# Patient Record
Sex: FEMALE | Race: WHITE | URBAN - METROPOLITAN AREA
[De-identification: names, ages, dates, MRNs, and addresses within clinical notes are randomized per-mention and may not be internally consistent; named-entity substitution may affect disease eponyms.]

---

## 2018-09-30 ENCOUNTER — INPATIENT (INPATIENT)
Facility: HOSPITAL | Age: 83
LOS: 3 days | Discharge: HOME HEALTH | End: 2018-10-04
Attending: FAMILY MEDICINE | Admitting: FAMILY MEDICINE
Payer: MEDICARE

## 2018-09-30 VITALS
TEMPERATURE: 99 F | DIASTOLIC BLOOD PRESSURE: 107 MMHG | HEIGHT: 63 IN | WEIGHT: 160.06 LBS | SYSTOLIC BLOOD PRESSURE: 184 MMHG | RESPIRATION RATE: 18 BRPM | HEART RATE: 95 BPM | OXYGEN SATURATION: 98 %

## 2018-09-30 LAB
ALBUMIN SERPL ELPH-MCNC: 3.6 G/DL — SIGNIFICANT CHANGE UP (ref 3.3–5)
ALP SERPL-CCNC: 485 U/L — HIGH (ref 40–120)
ALT FLD-CCNC: 27 U/L — SIGNIFICANT CHANGE UP (ref 12–78)
ANION GAP SERPL CALC-SCNC: 8 MMOL/L — SIGNIFICANT CHANGE UP (ref 5–17)
APPEARANCE UR: CLEAR — SIGNIFICANT CHANGE UP
APTT BLD: 31.3 SEC — SIGNIFICANT CHANGE UP (ref 27.5–37.4)
AST SERPL-CCNC: 30 U/L — SIGNIFICANT CHANGE UP (ref 15–37)
BACTERIA # UR AUTO: ABNORMAL
BASOPHILS # BLD AUTO: 0.06 K/UL — SIGNIFICANT CHANGE UP (ref 0–0.2)
BASOPHILS NFR BLD AUTO: 0.6 % — SIGNIFICANT CHANGE UP (ref 0–2)
BILIRUB SERPL-MCNC: 0.7 MG/DL — SIGNIFICANT CHANGE UP (ref 0.2–1.2)
BILIRUB UR-MCNC: NEGATIVE — SIGNIFICANT CHANGE UP
BUN SERPL-MCNC: 15 MG/DL — SIGNIFICANT CHANGE UP (ref 7–23)
CALCIUM SERPL-MCNC: 9.1 MG/DL — SIGNIFICANT CHANGE UP (ref 8.5–10.1)
CHLORIDE SERPL-SCNC: 105 MMOL/L — SIGNIFICANT CHANGE UP (ref 96–108)
CO2 SERPL-SCNC: 26 MMOL/L — SIGNIFICANT CHANGE UP (ref 22–31)
COLOR SPEC: YELLOW — SIGNIFICANT CHANGE UP
CREAT SERPL-MCNC: 0.74 MG/DL — SIGNIFICANT CHANGE UP (ref 0.5–1.3)
DIFF PNL FLD: ABNORMAL
EOSINOPHIL # BLD AUTO: 0 K/UL — SIGNIFICANT CHANGE UP (ref 0–0.5)
EOSINOPHIL NFR BLD AUTO: 0 % — SIGNIFICANT CHANGE UP (ref 0–6)
EPI CELLS # UR: SIGNIFICANT CHANGE UP
GLUCOSE SERPL-MCNC: 98 MG/DL — SIGNIFICANT CHANGE UP (ref 70–99)
GLUCOSE UR QL: NEGATIVE MG/DL — SIGNIFICANT CHANGE UP
HCT VFR BLD CALC: 44.5 % — SIGNIFICANT CHANGE UP (ref 34.5–45)
HGB BLD-MCNC: 15.3 G/DL — SIGNIFICANT CHANGE UP (ref 11.5–15.5)
IMM GRANULOCYTES NFR BLD AUTO: 0.2 % — SIGNIFICANT CHANGE UP (ref 0–1.5)
INR BLD: 1.24 RATIO — HIGH (ref 0.88–1.16)
KETONES UR-MCNC: NEGATIVE — SIGNIFICANT CHANGE UP
LEUKOCYTE ESTERASE UR-ACNC: ABNORMAL
LYMPHOCYTES # BLD AUTO: 2.52 K/UL — SIGNIFICANT CHANGE UP (ref 1–3.3)
LYMPHOCYTES # BLD AUTO: 25.4 % — SIGNIFICANT CHANGE UP (ref 13–44)
MCHC RBC-ENTMCNC: 31.4 PG — SIGNIFICANT CHANGE UP (ref 27–34)
MCHC RBC-ENTMCNC: 34.4 GM/DL — SIGNIFICANT CHANGE UP (ref 32–36)
MCV RBC AUTO: 91.4 FL — SIGNIFICANT CHANGE UP (ref 80–100)
MONOCYTES # BLD AUTO: 0.78 K/UL — SIGNIFICANT CHANGE UP (ref 0–0.9)
MONOCYTES NFR BLD AUTO: 7.8 % — SIGNIFICANT CHANGE UP (ref 2–14)
NEUTROPHILS # BLD AUTO: 6.56 K/UL — SIGNIFICANT CHANGE UP (ref 1.8–7.4)
NEUTROPHILS NFR BLD AUTO: 66 % — SIGNIFICANT CHANGE UP (ref 43–77)
NITRITE UR-MCNC: NEGATIVE — SIGNIFICANT CHANGE UP
NRBC # BLD: 0 /100 WBCS — SIGNIFICANT CHANGE UP (ref 0–0)
PH UR: 6.5 — SIGNIFICANT CHANGE UP (ref 5–8)
PLATELET # BLD AUTO: 277 K/UL — SIGNIFICANT CHANGE UP (ref 150–400)
POTASSIUM SERPL-MCNC: 4 MMOL/L — SIGNIFICANT CHANGE UP (ref 3.5–5.3)
POTASSIUM SERPL-SCNC: 4 MMOL/L — SIGNIFICANT CHANGE UP (ref 3.5–5.3)
PROT SERPL-MCNC: 8.1 GM/DL — SIGNIFICANT CHANGE UP (ref 6–8.3)
PROT UR-MCNC: NEGATIVE MG/DL — SIGNIFICANT CHANGE UP
PROTHROM AB SERPL-ACNC: 13.4 SEC — HIGH (ref 9.8–12.7)
RBC # BLD: 4.87 M/UL — SIGNIFICANT CHANGE UP (ref 3.8–5.2)
RBC # FLD: 12.6 % — SIGNIFICANT CHANGE UP (ref 10.3–14.5)
RBC CASTS # UR COMP ASSIST: SIGNIFICANT CHANGE UP /HPF (ref 0–4)
SODIUM SERPL-SCNC: 139 MMOL/L — SIGNIFICANT CHANGE UP (ref 135–145)
SP GR SPEC: 1.01 — SIGNIFICANT CHANGE UP (ref 1.01–1.02)
TROPONIN I SERPL-MCNC: <0.015 NG/ML — SIGNIFICANT CHANGE UP (ref 0.01–0.04)
TROPONIN I SERPL-MCNC: <0.015 NG/ML — SIGNIFICANT CHANGE UP (ref 0.01–0.04)
UROBILINOGEN FLD QL: NEGATIVE MG/DL — SIGNIFICANT CHANGE UP
WBC # BLD: 9.94 K/UL — SIGNIFICANT CHANGE UP (ref 3.8–10.5)
WBC # FLD AUTO: 9.94 K/UL — SIGNIFICANT CHANGE UP (ref 3.8–10.5)
WBC UR QL: ABNORMAL

## 2018-09-30 PROCEDURE — 93010 ELECTROCARDIOGRAM REPORT: CPT

## 2018-09-30 PROCEDURE — 99285 EMERGENCY DEPT VISIT HI MDM: CPT

## 2018-09-30 PROCEDURE — 71045 X-RAY EXAM CHEST 1 VIEW: CPT | Mod: 26

## 2018-09-30 PROCEDURE — 70450 CT HEAD/BRAIN W/O DYE: CPT | Mod: 26

## 2018-09-30 RX ORDER — CEFTRIAXONE 500 MG/1
1000 INJECTION, POWDER, FOR SOLUTION INTRAMUSCULAR; INTRAVENOUS ONCE
Qty: 0 | Refills: 0 | Status: COMPLETED | OUTPATIENT
Start: 2018-09-30 | End: 2018-09-30

## 2018-09-30 RX ORDER — CEFTRIAXONE 500 MG/1
1000 INJECTION, POWDER, FOR SOLUTION INTRAMUSCULAR; INTRAVENOUS EVERY 24 HOURS
Qty: 0 | Refills: 0 | Status: DISCONTINUED | OUTPATIENT
Start: 2018-09-30 | End: 2018-10-03

## 2018-09-30 RX ORDER — SODIUM CHLORIDE 9 MG/ML
1000 INJECTION INTRAMUSCULAR; INTRAVENOUS; SUBCUTANEOUS ONCE
Qty: 0 | Refills: 0 | Status: COMPLETED | OUTPATIENT
Start: 2018-09-30 | End: 2018-09-30

## 2018-09-30 RX ORDER — SODIUM CHLORIDE 9 MG/ML
500 INJECTION INTRAMUSCULAR; INTRAVENOUS; SUBCUTANEOUS
Qty: 0 | Refills: 0 | Status: DISCONTINUED | OUTPATIENT
Start: 2018-09-30 | End: 2018-10-04

## 2018-09-30 RX ADMIN — CEFTRIAXONE 1000 MILLIGRAM(S): 500 INJECTION, POWDER, FOR SOLUTION INTRAMUSCULAR; INTRAVENOUS at 19:23

## 2018-09-30 RX ADMIN — SODIUM CHLORIDE 2000 MILLILITER(S): 9 INJECTION INTRAMUSCULAR; INTRAVENOUS; SUBCUTANEOUS at 13:50

## 2018-09-30 NOTE — H&P ADULT - ASSESSMENT
86 yo F with PMH significant for Afib On Eliquis BIB daughter to the ED for eval. of Dizziness.    # UTI   - Admit to Med-Surg  - Pt received IV Ceftriaxone in the ED  - c/w IV Ceftriaxone  - UC/BC  - Tylenol prn    # Dizziness: likely 2/2 to dehydration  - Pt received IVF Ns 1L in  the ED, no dizziness at present  - c/w IV hydration  - Fall precaution  - Neuro check  - Orthostatic vital    # Hx of Afib on Eliquis  - c/w Eliquis  - Meds..    # DVT Ppx  - On eliquis    IMPROVE VTE Individual Risk Assessment    RISK                                                                Points    [  ] Previous VTE                                                  3    [  ] Thrombophilia                                               2    [  ] Lower limb paralysis                                      2        (unable to hold up >15 seconds)      [  ] Current Cancer                                              2         (within 6 months)    [  ] Immobilization > 24 hrs                                1    [  ] ICU/CCU stay > 24 hours                              1    [  ] Age > 60                                                      1    IMPROVE VTE Score ____2_____    Case d/w  86 yo F with PMH significant for Afib On Eliquis BIB daughter to the ED for eval. of Dizziness.    # UTI   - Admit to Med-Surg  - Pt received IV Ceftriaxone in the ED  - c/w IV Ceftriaxone  - UC/BC  - Tylenol prn    # Dizziness: likely 2/2 to dehydration  - Pt received IVF Ns 1L in  the ED, no dizziness at present  - c/w IV hydration  - Fall precaution  - Neuro check  - Orthostatic vital    # Elevated ALP  - ALP: 485, In the past ALP was elevated too  - CT head resulted as The calvarium demonstrates patchy sclerosis which may reflect Pagets disease.  - Daytime to obtain further collateral form PCP in AM and based on w/u in the past, order further w/u and consult if needed    # Hx of Afib on Eliquis  - c/w Eliquis  - Meds..    # DVT Ppx  - On eliquis    IMPROVE VTE Individual Risk Assessment    RISK                                                                Points    [  ] Previous VTE                                                  3    [  ] Thrombophilia                                               2    [  ] Lower limb paralysis                                      2        (unable to hold up >15 seconds)      [  ] Current Cancer                                              2         (within 6 months)    [  ] Immobilization > 24 hrs                                1    [  ] ICU/CCU stay > 24 hours                              1    [  ] Age > 60                                                      1    IMPROVE VTE Score ____2_____    Case d/w  86 yo F with PMH significant for Afib On Eliquis, Dyslipidemia, CAD, Paget's disease BIB daughter to the ED for eval. of Dizziness.    # UTI   - Admit to Med-Surg  - Pt received IV Ceftriaxone in the ED  - c/w IV Ceftriaxone  - UC/BC  - Tylenol prn    # Dizziness: likely 2/2 to dehydration, r/o Arrythmia  - Pt received IVF Ns 1L in  the ED, no dizziness at present  - c/w IV hydration  - Fall precaution  - Neuro check  - Neuro consult  - Vitamin B12,TSH  - Orthostatic vital  - Trop x 2 negative    # Hx of Paget's disease - Elevated ALP  - ALP: 485, In the past ALP was elevated too  - CT head resulted as The calvarium demonstrates patchy sclerosis which may reflect Pagets disease.  - Outpt f/u after discharge     # Hx of Afib on Eliquis  - c/w Eliquis  - Pt could not provide meds list - daytime to verify home meds in AM    # DVT Ppx  - On Eliquis    ****** Pt is poor historian, confused, does not remember home meds, Daytime to verify home meds in AM and to complete med-Rec    IMPROVE VTE Individual Risk Assessment    RISK                                                                Points    [  ] Previous VTE                                                  3    [  ] Thrombophilia                                               2    [  ] Lower limb paralysis                                      2        (unable to hold up >15 seconds)      [  ] Current Cancer                                              2         (within 6 months)    [  ] Immobilization > 24 hrs                                1    [  ] ICU/CCU stay > 24 hours                              1    [  ] Age > 60                                                      1    IMPROVE VTE Score ____2_____    Case d/w Dr. Negro 88 yo F with PMH significant for Afib On Eliquis, Dyslipidemia, CAD, Paget's disease BIB daughter to the ED for eval. of Dizziness.    # UTI   - Admit to Med-Surg  - Pt received IV Ceftriaxone in the ED  - c/w IV Ceftriaxone  - UC/BC  - Tylenol prn    # Dizziness  # Dehydration  - Pt received IVF Ns 1L in  the ED, no dizziness at present  - c/w IV hydration  - Fall precaution  - Neuro check  - Neuro consult  - Vitamin B12,TSH  - Orthostatic vital  - Trop x 2 negative  - CTHead: negative for acute ICH or infarct    # Hx of Paget's disease - Elevated ALP  - ALP: 485, In the past ALP was elevated too  - CT head resulted as The calvarium demonstrates patchy sclerosis which may reflect Pagets disease.  - Outpt f/u after discharge     # Hx of Afib on Eliquis  - c/w Eliquis  - Pt could not provide meds list - daytime to verify home meds in AM    # DVT Ppx  - On Eliquis    ****** Pt is poor historian, confused, does not remember home meds, Daytime to verify home meds in AM and to complete med-Rec    IMPROVE VTE Individual Risk Assessment    RISK                                                                Points    [  ] Previous VTE                                                  3    [  ] Thrombophilia                                               2    [  ] Lower limb paralysis                                      2        (unable to hold up >15 seconds)      [  ] Current Cancer                                              2         (within 6 months)    [  ] Immobilization > 24 hrs                                1    [  ] ICU/CCU stay > 24 hours                              1    [  ] Age > 60                                                      1    IMPROVE VTE Score ____2_____    Case d/w Dr. Negro

## 2018-09-30 NOTE — ED PROVIDER NOTE - CARDIAC, MLM
Normal rate, irregularly irregular rhythm.  Heart sounds S1, S2.  No murmurs, rubs or gallops. No carotid bruit

## 2018-09-30 NOTE — ED PROVIDER NOTE - OBJECTIVE STATEMENT
86 y/o female with a PMHx of AFib on Eliquis presents to the ED c/o dizziness. Daughter notes pt was at baseline last night and woke up this AM with onset of dizziness. Pt was unable to ambulate, normally walks without cane or walker at baseline. Former smoker, occasional ETOH use. Pt does not feel dizzy in the ED. Denies painful urination, NVD, or abd pain. PCP Dr. Pena.

## 2018-09-30 NOTE — ED ADULT TRIAGE NOTE - CHIEF COMPLAINT QUOTE
Pt BIBA with report of hypertension. Pt reports feeling fatigued and generally weak, but denies any focal weakness.  Pt reports feeling dizzy and unable to walk without assistance this am. Pt normally active and gardening.  As per EMS, no focal weakness. Pt BIBA with report of hypertension. Pt reports feeling fatigued and generally weak, but denies any focal weakness.  Pt reports feeling dizzy and unable to walk without assistance this am. Pt normally active and gardening.  As per EMS, no focal weakness.  No facial droop or speech changes. Pt alert and able to answer questions appropriately.

## 2018-09-30 NOTE — ED ADULT NURSE NOTE - CHPI ED NUR SYMPTOMS NEG
no vomiting/no confusion/no change in level of consciousness/no blurred vision/no fever/no loss of consciousness/no nausea/no numbness/no weakness

## 2018-09-30 NOTE — H&P ADULT - ATTENDING COMMENTS
Patient seen and examined after initial evaluation above by BERTA Jones. Case discussed and reviewed in detail. Please note my plan below.     86 y/o F with PMH of a-fib, dyslipidemia, paget's disease, CAD, p/w dizziness.     *Dizziness   -CTH negative for acute findings  -Neuro consult  -Fall precautions  -Orthostatics  -Tele monitoring  -Vit B12 / TSH    *UTI  -Ceftriaxone  -Urine cx     *H/o Dyslipidemia / paget's disease / CAD  -Will need to confirm home medication list in AM as patient is a poor historian    *DVT ppx  -On Eliquis

## 2018-09-30 NOTE — ED ADULT NURSE NOTE - NSIMPLEMENTINTERV_GEN_ALL_ED
Implemented All Universal Safety Interventions:  [place] to call system. Call bell, personal items and telephone within reach. Instruct patient to call for assistance. Room bathroom lighting operational. Non-slip footwear when patient is off stretcher. Physically safe environment: no spills, clutter or unnecessary equipment. Stretcher in lowest position, wheels locked, appropriate side rails in place.

## 2018-09-30 NOTE — ED PROVIDER NOTE - EYES, MLM
Clear bilaterally, pupils equal, round and reactive to light. No horizontal nystagmus. Negative Paul Halpike test.

## 2018-09-30 NOTE — H&P ADULT - NSHPPHYSICALEXAM_GEN_ALL_CORE
Vital Signs Last 24 Hrs  T(C): 36.7 (30 Sep 2018 20:53), Max: 37.1 (30 Sep 2018 13:17)  T(F): 98.1 (30 Sep 2018 20:53), Max: 98.7 (30 Sep 2018 13:17)  HR: 83 (30 Sep 2018 20:53) (80 - 95)  BP: 131/78 (30 Sep 2018 20:53) (131/78 - 184/107)  RR: 19 (30 Sep 2018 20:53) (18 - 19)  SpO2: 97% (30 Sep 2018 20:53) (97% - 98%)

## 2018-09-30 NOTE — ED ADULT NURSE NOTE - CHIEF COMPLAINT QUOTE
Pt BIBA with report of hypertension. Pt reports feeling fatigued and generally weak, but denies any focal weakness.  Pt reports feeling dizzy and unable to walk without assistance this am. Pt normally active and gardening.  As per EMS, no focal weakness.  No facial droop or speech changes. Pt alert and able to answer questions appropriately.

## 2018-09-30 NOTE — H&P ADULT - HISTORY OF PRESENT ILLNESS
86 yo F with PMH significant for Afib On Eliquis BIB daughter to the ED for eval. of Dizziness. Pt is poor historian, confused. Daughter was at bedside earlier but not on my assessment. As per ED report, Pt was c/o dizziness this AM. No dizziness at present. In the ED, on w/u found to have UTI. Denies headache, chest pain, palpitations or SOB. Denies fever, chills, cough or dysuria, urinary urgency or frequency.     Pt received IVF Nacl 1L and IV Ceftriaxone x 1 in the ED.     Family h/o: Pt could not provide any detail of family h/o. 88 yo F with PMH significant for Afib On Eliquis, Dyslipidemia, CAD, Paget's Disease BIB daughter to the ED for eval. of Dizziness. Pt is poor historian, confused. Daughter was at bedside earlier but not during my assessment. As per ED report, Pt was c/o dizziness this AM. No dizziness at present. In the ED, on w/u found to have UTI. Denies headache, chest pain, palpitations or SOB. Denies fever, chills, cough or dysuria, urinary urgency or frequency.     Pt received IVF Nacl 1L and IV Ceftriaxone x 1 in the ED.     Family h/o: Pt could not provide any detail of family h/o.

## 2018-10-01 LAB
ANION GAP SERPL CALC-SCNC: 9 MMOL/L — SIGNIFICANT CHANGE UP (ref 5–17)
APTT BLD: 29.4 SEC — SIGNIFICANT CHANGE UP (ref 27.5–37.4)
BASOPHILS # BLD AUTO: 0.05 K/UL — SIGNIFICANT CHANGE UP (ref 0–0.2)
BASOPHILS NFR BLD AUTO: 0.6 % — SIGNIFICANT CHANGE UP (ref 0–2)
BUN SERPL-MCNC: 14 MG/DL — SIGNIFICANT CHANGE UP (ref 7–23)
CALCIUM SERPL-MCNC: 8.5 MG/DL — SIGNIFICANT CHANGE UP (ref 8.5–10.1)
CHLORIDE SERPL-SCNC: 106 MMOL/L — SIGNIFICANT CHANGE UP (ref 96–108)
CHOLEST SERPL-MCNC: 162 MG/DL — SIGNIFICANT CHANGE UP (ref 10–199)
CO2 SERPL-SCNC: 23 MMOL/L — SIGNIFICANT CHANGE UP (ref 22–31)
CREAT SERPL-MCNC: 0.72 MG/DL — SIGNIFICANT CHANGE UP (ref 0.5–1.3)
EOSINOPHIL # BLD AUTO: 0 K/UL — SIGNIFICANT CHANGE UP (ref 0–0.5)
EOSINOPHIL NFR BLD AUTO: 0 % — SIGNIFICANT CHANGE UP (ref 0–6)
GLUCOSE SERPL-MCNC: 99 MG/DL — SIGNIFICANT CHANGE UP (ref 70–99)
HCT VFR BLD CALC: 39.4 % — SIGNIFICANT CHANGE UP (ref 34.5–45)
HDLC SERPL-MCNC: 52 MG/DL — SIGNIFICANT CHANGE UP
HGB BLD-MCNC: 13.2 G/DL — SIGNIFICANT CHANGE UP (ref 11.5–15.5)
IMM GRANULOCYTES NFR BLD AUTO: 0.4 % — SIGNIFICANT CHANGE UP (ref 0–1.5)
INR BLD: 1.24 RATIO — HIGH (ref 0.88–1.16)
LIPID PNL WITH DIRECT LDL SERPL: 86 MG/DL — SIGNIFICANT CHANGE UP
LYMPHOCYTES # BLD AUTO: 2.16 K/UL — SIGNIFICANT CHANGE UP (ref 1–3.3)
LYMPHOCYTES # BLD AUTO: 27.6 % — SIGNIFICANT CHANGE UP (ref 13–44)
MAGNESIUM SERPL-MCNC: 2.1 MG/DL — SIGNIFICANT CHANGE UP (ref 1.6–2.6)
MCHC RBC-ENTMCNC: 31 PG — SIGNIFICANT CHANGE UP (ref 27–34)
MCHC RBC-ENTMCNC: 33.5 GM/DL — SIGNIFICANT CHANGE UP (ref 32–36)
MCV RBC AUTO: 92.5 FL — SIGNIFICANT CHANGE UP (ref 80–100)
MONOCYTES # BLD AUTO: 0.67 K/UL — SIGNIFICANT CHANGE UP (ref 0–0.9)
MONOCYTES NFR BLD AUTO: 8.5 % — SIGNIFICANT CHANGE UP (ref 2–14)
NEUTROPHILS # BLD AUTO: 4.93 K/UL — SIGNIFICANT CHANGE UP (ref 1.8–7.4)
NEUTROPHILS NFR BLD AUTO: 62.9 % — SIGNIFICANT CHANGE UP (ref 43–77)
NRBC # BLD: 0 /100 WBCS — SIGNIFICANT CHANGE UP (ref 0–0)
PHOSPHATE SERPL-MCNC: 4 MG/DL — SIGNIFICANT CHANGE UP (ref 2.5–4.5)
PLATELET # BLD AUTO: 248 K/UL — SIGNIFICANT CHANGE UP (ref 150–400)
POTASSIUM SERPL-MCNC: 4.2 MMOL/L — SIGNIFICANT CHANGE UP (ref 3.5–5.3)
POTASSIUM SERPL-SCNC: 4.2 MMOL/L — SIGNIFICANT CHANGE UP (ref 3.5–5.3)
PROTHROM AB SERPL-ACNC: 13.5 SEC — HIGH (ref 9.8–12.7)
RBC # BLD: 4.26 M/UL — SIGNIFICANT CHANGE UP (ref 3.8–5.2)
RBC # FLD: 12.8 % — SIGNIFICANT CHANGE UP (ref 10.3–14.5)
SODIUM SERPL-SCNC: 138 MMOL/L — SIGNIFICANT CHANGE UP (ref 135–145)
TOTAL CHOLESTEROL/HDL RATIO MEASUREMENT: 3.1 RATIO — LOW (ref 3.3–7.1)
TRIGL SERPL-MCNC: 120 MG/DL — SIGNIFICANT CHANGE UP (ref 10–149)
TSH SERPL-MCNC: 3.78 UU/ML — SIGNIFICANT CHANGE UP (ref 0.34–4.82)
VIT B12 SERPL-MCNC: 426 PG/ML — SIGNIFICANT CHANGE UP (ref 232–1245)
WBC # BLD: 7.84 K/UL — SIGNIFICANT CHANGE UP (ref 3.8–10.5)
WBC # FLD AUTO: 7.84 K/UL — SIGNIFICANT CHANGE UP (ref 3.8–10.5)

## 2018-10-01 PROCEDURE — 99285 EMERGENCY DEPT VISIT HI MDM: CPT

## 2018-10-01 RX ORDER — INFLUENZA VIRUS VACCINE 15; 15; 15; 15 UG/.5ML; UG/.5ML; UG/.5ML; UG/.5ML
0.5 SUSPENSION INTRAMUSCULAR ONCE
Qty: 0 | Refills: 0 | Status: DISCONTINUED | OUTPATIENT
Start: 2018-10-01 | End: 2018-10-04

## 2018-10-01 RX ORDER — ATENOLOL 25 MG/1
50 TABLET ORAL DAILY
Qty: 0 | Refills: 0 | Status: DISCONTINUED | OUTPATIENT
Start: 2018-10-01 | End: 2018-10-04

## 2018-10-01 RX ORDER — APIXABAN 2.5 MG/1
2.5 TABLET, FILM COATED ORAL EVERY 12 HOURS
Qty: 0 | Refills: 0 | Status: DISCONTINUED | OUTPATIENT
Start: 2018-10-01 | End: 2018-10-04

## 2018-10-01 RX ORDER — ATORVASTATIN CALCIUM 80 MG/1
80 TABLET, FILM COATED ORAL AT BEDTIME
Qty: 0 | Refills: 0 | Status: DISCONTINUED | OUTPATIENT
Start: 2018-10-01 | End: 2018-10-04

## 2018-10-01 RX ADMIN — ATENOLOL 50 MILLIGRAM(S): 25 TABLET ORAL at 13:23

## 2018-10-01 RX ADMIN — ATORVASTATIN CALCIUM 80 MILLIGRAM(S): 80 TABLET, FILM COATED ORAL at 21:26

## 2018-10-01 RX ADMIN — CEFTRIAXONE 1000 MILLIGRAM(S): 500 INJECTION, POWDER, FOR SOLUTION INTRAMUSCULAR; INTRAVENOUS at 21:26

## 2018-10-01 RX ADMIN — APIXABAN 2.5 MILLIGRAM(S): 2.5 TABLET, FILM COATED ORAL at 17:12

## 2018-10-01 NOTE — CONSULT NOTE ADULT - ASSESSMENT
86 yo F with PMH significant for Afib On Eliquis, Dyslipidemia, CAD, Paget's Disease BIB daughter to the ED for eval. of Dizziness. Pt is poor historian, confused.     UTI  with Dizziness   most likely Metabolic causes.  Hx of Afib on Eliquis  Pt already on Eliquis.  If symptomatic consider mRI.  Correct underlying Metabolic problems.  Memory problems/ Confusion Underlying Dementia?   Base line MS not known aggrevated by UTI.  get more information from Family.  F/u as needed.

## 2018-10-01 NOTE — PROGRESS NOTE ADULT - SUBJECTIVE AND OBJECTIVE BOX
CC: c/o Dizziness (01 Oct 2018 11:32)    HPI:  88 yo F with PMH significant for Afib On Eliquis, Dyslipidemia, CAD, Paget's Disease BIB daughter to the ED for eval. of Dizziness. Pt is poor historian, confused. Daughter was at bedside earlier but not during my assessment. As per ED report, Pt was c/o dizziness this AM. No dizziness at present. In the ED, on w/u found to have UTI. Denies headache, chest pain, palpitations or SOB. Denies fever, chills, cough or dysuria, urinary urgency or frequency.         INTERVAL HPI/ OVERNIGHT EVENTS: Chart reviewed Pt was seen and examined, poor historian, history provided by daughter. Reports mental status at baseline, Pt is forgetful but lives alone and able to perform basic ADLs. Pt states that feel well today, no dizziness, no pain. Afebrile     Vital Signs Last 24 Hrs  T(C): 36.7 (01 Oct 2018 06:55), Max: 37.1 (30 Sep 2018 13:17)  T(F): 98.1 (01 Oct 2018 06:55), Max: 98.7 (30 Sep 2018 13:17)  HR: 86 (01 Oct 2018 08:00) (79 - 95)  BP: 135/82 (01 Oct 2018 08:00) (120/59 - 184/107)  RR: 20 (01 Oct 2018 08:00) (16 - 20)  SpO2: 95% (01 Oct 2018 06:55) (95% - 100%)      REVIEW OF SYSTEMS:  Unreliable historian, but reports all negative      PHYSICAL EXAM:  General: Well developed;  in no acute distress  Eyes: PERRLA, EOMI; conjunctiva and sclera clear  Head: Normocephalic; atraumatic  ENMT: No nasal discharge; airway clear  Neck: Supple; non tender; no masses  Respiratory: Good air entry, No wheezes, rales or rhonchi  Cardiovascular: Irregular rate and rhythm. S1 and S2 Normal; No murmurs  Gastrointestinal: Soft non-tender non-distended; Normal bowel sounds  Genitourinary: No costovertebral angle tenderness  Extremities: Normal range of motion, No  edema  Vascular: Peripheral pulses palpable 2+ bilaterally  Neurological: Alert and oriented x2, non focal   Skin: Warm and dry. No acute rash  Musculoskeletal: Normal muscle  tone, without deformities  Psychiatric: Cooperative and appropriate      LABS:     CARDIAC MARKERS ( 30 Sep 2018 17:26 )  <0.015 ng/mL / x     / x     / x     / x      CARDIAC MARKERS ( 30 Sep 2018 13:30 )  <0.015 ng/mL / x     / x     / x     / x                                13.2   7.84  )-----------( 248      ( 01 Oct 2018 05:46 )             39.4     01 Oct 2018 05:46    138    |  106    |  14     ----------------------------<  99     4.2     |  23     |  0.72     Ca    8.5        01 Oct 2018 05:46  Phos  4.0       01 Oct 2018 05:46  Mg     2.1       01 Oct 2018 05:46    TPro  8.1    /  Alb  3.6    /  TBili  0.7    /  DBili  x      /  AST  30     /  ALT  27     /  AlkPhos  485    30 Sep 2018 13:30  PT/INR - ( 01 Oct 2018 05:46 )   PT: 13.5 sec;   INR: 1.24 ratio    PTT - ( 01 Oct 2018 05:46 )  PTT:29.4 sec    LIVER FUNCTIONS - ( 30 Sep 2018 13:30 )  Alb: 3.6 g/dL / Pro: 8.1 gm/dL / ALK PHOS: 485 U/L / ALT: 27 U/L / AST: 30 U/L / GGT: x           Urinalysis Basic - ( 30 Sep 2018 14:57 )    Color: Yellow / Appearance: Clear / S.015 / pH: x  Gluc: x / Ketone: Negative  / Bili: Negative / Urobili: Negative mg/dL   Blood: x / Protein: Negative mg/dL / Nitrite: Negative   Leuk Esterase: Moderate / RBC: 0-2 /HPF / WBC 26-50   Sq Epi: x / Non Sq Epi: Occasional / Bacteria: Many        MEDICATIONS  (STANDING):  apixaban 2.5 milliGRAM(s) Oral every 12 hours  ATENolol  Tablet 50 milliGRAM(s) Oral daily  atorvastatin 80 milliGRAM(s) Oral at bedtime  cefTRIAXone Injectable. 1000 milliGRAM(s) IV Push every 24 hours  sodium chloride 0.9%. 500 milliLiter(s) (50 mL/Hr) IV Continuous <Continuous>    MEDICATIONS  (PRN):      RADIOLOGY & ADDITIONAL TESTS:    EXAM:  CT BRAIN                        PROCEDURE DATE:  2018      INTERPRETATION:      FINDINGS:   No previous examinations are available for review.  The brain demonstrates mild periventricular white matter ischemia.   No   acute cerebral cortical infarct is seen.  No intracranial hemorrhage is   found.  No mass effect is found in the brain.      The ventricles, sulci and basal cisterns appear unremarkable.       The orbits are unremarkable.  The paranasal sinuses are significant for   mild mucosal thickening in the BILATERAL maxillary sinuses.  The nasal   cavity appears intact.  The nasopharynx is symmetric.  The central skull   base, petrous temporal bones remain intact. The calvarium demonstrates   patchy sclerosis which may reflect Pagets disease.      IMPRESSION:   Mild periventricular white matter ischemia.    The   calvarium demonstrates patchy sclerosis which may reflect Pagets disease.      EXAM:  XR CHEST PORTABLE IMMED 1V                         PROCEDURE DATE:  2018     INTERPRETATION:      FINDINGS/  IMPRESSION:       The lungs are clear. There is no pleural effusion. There is no   pneumothorax.  The cardiac silhouette is within normal limits.  There is   osteopenia and degenerative changes.

## 2018-10-01 NOTE — ED ADULT NURSE REASSESSMENT NOTE - NS ED NURSE REASSESS COMMENT FT1
Pt sitting on chair appears to be comfortable. A&Ox3. MAEx4. ambulation trial completed. Continues to be unstable. VSS. Pt to be admitted. ABX given as ordered and tolerated well. All needs are met and safety maintained. Will continue to monitor.
pt unable to ambulate without assistance. pt weak and notes dizziness upon standing. pt assisted back into bed. daughter witnessing ambulation and notes that weakness with ambulation has only occurred since yesterday. MD Josue made aware. pt to be admitted for further treatment. pt daughter acknowledge plan of care. will cont to monitor.
Assumed care of patient from CARLOS Bowman. Patient resting comfortably in bed. Safety and comfort maintained. Will continue to monitor.

## 2018-10-01 NOTE — ED ADULT NURSE REASSESSMENT NOTE - GENERAL PATIENT STATE
comfortable appearance
comfortable appearance
no change observed/resting/sleeping/comfortable appearance

## 2018-10-01 NOTE — ED ADULT NURSE REASSESSMENT NOTE - NSIMPLEMENTINTERV_GEN_ALL_ED
Implemented All Fall with Harm Risk Interventions:  [place] to call system. Call bell, personal items and telephone within reach. Instruct patient to call for assistance. Room bathroom lighting operational. Non-slip footwear when patient is off stretcher. Physically safe environment: no spills, clutter or unnecessary equipment. Stretcher in lowest position, wheels locked, appropriate side rails in place. Provide visual cue, wrist band, yellow gown, etc. Monitor gait and stability. Monitor for mental status changes and reorient to person, place, and time. Review medications for side effects contributing to fall risk. Reinforce activity limits and safety measures with patient and family. Provide visual clues: red socks.

## 2018-10-01 NOTE — CONSULT NOTE ADULT - SUBJECTIVE AND OBJECTIVE BOX
Patient is a 87y old  Female who presents with a chief complaint of c/o Dizziness (30 Sep 2018 21:20) Pt unable to give information       HPI: history obtained from Chart.  88 yo F with PMH significant for Afib On Eliquis, Dyslipidemia, CAD, Paget's Disease BIB daughter to the ED for eval. of Dizziness. Pt is poor historian, confused. Daughter was at bedside earlier but not during my assessment. As per ED report, Pt was c/o dizziness this AM. No dizziness at present. In the ED, on w/u found to have UTI. Denies headache, chest pain, palpitations or SOB. Denies fever, chills, cough or dysuria, urinary urgency or frequency. Reason for Neuro evaluation unclear. Pt is confused and disoriented unable to provide any information.    Family h/o: Pt could not provide any detail of family h/o. (30 Sep 2018 21:20)      PAST MEDICAL & SURGICAL HISTORY:  CAD (coronary artery disease)  Dyslipidemia  No significant past surgical history      FAMILY HISTORY:      Social Hx:  Nonsmoker, no drug or alcohol use    MEDICATIONS  (STANDING):  cefTRIAXone Injectable. 1000 milliGRAM(s) IV Push every 24 hours  sodium chloride 0.9%. 500 milliLiter(s) (50 mL/Hr) IV Continuous <Continuous>       Allergies    No Known Allergies      ROS: Pertinent positives in HPI, all other ROS were reviewed and are negative.      Vital Signs Last 24 Hrs  T(C): 36.7 (01 Oct 2018 06:55), Max: 37.1 (30 Sep 2018 13:17)  T(F): 98.1 (01 Oct 2018 06:55), Max: 98.7 (30 Sep 2018 13:17)  HR: 86 (01 Oct 2018 08:00) (79 - 95)  BP: 135/82 (01 Oct 2018 08:00) (120/59 - 184/107)  BP(mean): --  RR: 20 (01 Oct 2018 08:00) (16 - 20)  SpO2: 95% (01 Oct 2018 06:55) (95% - 100%)        Constitutional: awake and alert., confused  HEENT: PERRLA, EOMI,   Neck: Supple.  Respiratory: Breath sounds are clear bilaterally  Cardiovascular: S1 and S2, regular rhythm  Gastrointestinal: soft, nontender  Extremities:  no edema  Vascular:  Carotid Bruit - no  Musculoskeletal: no joint swelling/tenderness, mild tremors both UE  Skin: No rashes    Neurological exam:  HF: A x O x 2 but not to date and events. poor historian and recall.   CN: NANDA, EOMI, VFF, facial sensation normal, no facial. gag intact.  Motor: No pronator drift, Strength 5/5 , bilat mild tremors, no cogwheel  rigidity noted.   Sens: Intact to light touch   Reflexes: Symmetric and normal . BJ 2+, BR 2+, KJ 2+, AJ 1+, downgoing toes b/l  Coord:  Not tested   Gait/Balance: Cannot test    Labs:   10-01    138  |  106  |  14  ----------------------------<  99  4.2   |  23  |  0.72    Ca    8.5      01 Oct 2018 05:46  Phos  4.0     10-01  Mg     2.1     10-01    TPro  8.1  /  Alb  3.6  /  TBili  0.7  /  DBili  x   /  AST  30  /  ALT  27  /  AlkPhos  485<H>  09-30    10-01 Chol 162 LDL 86 HDL 52 Trig 120                          13.2   7.84  )-----------( 248      ( 01 Oct 2018 05:46 )             39.4       Radiology:  - CT Head:  < from: CT Head No Cont (09.30.18 @ 15:19) >  IMPRESSION:   Mild periventricular white matter ischemia.    The   calvarium demonstrates patchy sclerosis which may reflect Pagets disease.

## 2018-10-01 NOTE — PROGRESS NOTE ADULT - ASSESSMENT
88 yo F with PMH significant for Afib On Eliquis, Dyslipidemia, CAD, Paget's  admitted for generalized weakness and dizziness.     #  Abnormal UA, likely UTI   - F/u UCX/ BCX   - c/w IV Ceftriaxone  - Tylenol prn    # Dizziness, unsteady gait, likely due to infection and UTI   # Dehydration  -- Trop x 2 negative  - CT Head: negative for acute ICH or infarct  - S/p  IVF Ns 1L  - Oral hydration   - Fall precaution  - Neuro checks  - Vitamin B12,TSH  - Orthostatic vital signs   - Neuro eval appreciated will check MRI if symptoms persist   - PT eval        # Hx of Paget's disease - Elevated ALP  - ALP: 485, In the past ALP was elevated too  - CT head resulted as The calvarium demonstrates patchy sclerosis which may reflect Pagets disease.  - Outpt f/u after discharge     # Hx of Afib on Eliquis  - Tele: no events, HR controlled   - c/w Eliquis  - C/w atenolol  -D/c tele     # DVT Ppx  - On Eliquis

## 2018-10-01 NOTE — ED ADULT NURSE REASSESSMENT NOTE - COMFORT CARE
meal provided
plan of care explained/warm blanket provided/repositioned
plan of care explained/side rails up/wait time explained
repositioned/side rails up/darkened lights

## 2018-10-02 PROCEDURE — 93010 ELECTROCARDIOGRAM REPORT: CPT

## 2018-10-02 PROCEDURE — 95816 EEG AWAKE AND DROWSY: CPT | Mod: 26

## 2018-10-02 PROCEDURE — 99233 SBSQ HOSP IP/OBS HIGH 50: CPT

## 2018-10-02 RX ORDER — PREGABALIN 225 MG/1
1000 CAPSULE ORAL DAILY
Qty: 0 | Refills: 0 | Status: DISCONTINUED | OUTPATIENT
Start: 2018-10-02 | End: 2018-10-04

## 2018-10-02 RX ADMIN — ATORVASTATIN CALCIUM 80 MILLIGRAM(S): 80 TABLET, FILM COATED ORAL at 20:48

## 2018-10-02 RX ADMIN — APIXABAN 2.5 MILLIGRAM(S): 2.5 TABLET, FILM COATED ORAL at 05:15

## 2018-10-02 RX ADMIN — APIXABAN 2.5 MILLIGRAM(S): 2.5 TABLET, FILM COATED ORAL at 17:34

## 2018-10-02 RX ADMIN — ATENOLOL 50 MILLIGRAM(S): 25 TABLET ORAL at 05:15

## 2018-10-02 RX ADMIN — CEFTRIAXONE 1000 MILLIGRAM(S): 500 INJECTION, POWDER, FOR SOLUTION INTRAMUSCULAR; INTRAVENOUS at 20:48

## 2018-10-02 NOTE — PHYSICAL THERAPY INITIAL EVALUATION ADULT - LEVEL OF INDEPENDENCE: STAND/SIT, REHAB EVAL
Phone Number Called: [phone] (home)     Message: I left a voicemail and sent her a Mychart, that I will be mailing her a map of the different [facility] locations. The closes [facility] to her home is the [address] [place] [address] location.    Left Message for patient to call back: yes       contact guard/stand-by assist

## 2018-10-02 NOTE — PROGRESS NOTE ADULT - SUBJECTIVE AND OBJECTIVE BOX
CC: c/o Dizziness (01 Oct 2018 11:32)    HPI:  88 yo F with PMH significant for Afib On Eliquis, Dyslipidemia, CAD, Paget's Disease BIB daughter to the ED for eval. of Dizziness. Pt is poor historian, confused. Daughter was at bedside earlier but not during my assessment. As per ED report, Pt was c/o dizziness this AM. No dizziness at present. In the ED, on w/u found to have UTI. Denies headache, chest pain, palpitations or SOB. Denies fever, chills, cough or dysuria, urinary urgency or frequency.         INTERVAL HPI/ OVERNIGHT EVENTS: Pt was seen and examined, poor historian, awake, alert, as per RN ambulates   well, but confused. No fevers     Vital Signs Last 24 Hrs  T(C): 36.8 (02 Oct 2018 16:40), Max: 36.9 (02 Oct 2018 11:33)  T(F): 98.2 (02 Oct 2018 16:40), Max: 98.5 (02 Oct 2018 11:33)  HR: 78 (02 Oct 2018 16:40) (70 - 86)  BP: 142/74 (02 Oct 2018 16:40) (123/78 - 176/94)  RR: 18 (02 Oct 2018 16:40) (16 - 20)  SpO2: 98% (02 Oct 2018 16:40) (95% - 100%)    REVIEW OF SYSTEMS:  Unreliable historian, but reports all negative      PHYSICAL EXAM:  General: Well developed;  in no acute distress  Eyes: PERRLA, EOMI; conjunctiva and sclera clear  Head: Normocephalic; atraumatic  ENMT: No nasal discharge; airway clear  Neck: Supple; non tender; no masses  Respiratory: Good air entry, No wheezes, rales or rhonchi  Cardiovascular: Irregular rate and rhythm. S1 and S2 Normal; No murmurs  Gastrointestinal: Soft non-tender non-distended; Normal bowel sounds  Genitourinary: No costovertebral angle tenderness  Extremities: Normal range of motion, No  edema  Vascular: Peripheral pulses palpable 2+ bilaterally  Neurological: Alert and oriented x2, non focal   Skin: Warm and dry. No acute rash  Musculoskeletal: Normal muscle  tone, without deformities  Psychiatric: Cooperative and appropriate      LABS:                         13.2   7.84  )-----------( 248      ( 01 Oct 2018 05:46 )             39.4     10-    138  |  106  |  14  ----------------------------<  99  4.2   |  23  |  0.72    Ca    8.5      01 Oct 2018 05:46  Phos  4.0     10-01  Mg     2.1     10-    PT/INR - ( 01 Oct 2018 05:46 )   PT: 13.5 sec;   INR: 1.24 ratio    PTT - ( 01 Oct 2018 05:46 )  PTT:29.4 sec      CARDIAC MARKERS ( 30 Sep 2018 17:26 )  <0.015 ng/mL / x     / x     / x     / x      CARDIAC MARKERS ( 30 Sep 2018 13:30 )  <0.015 ng/mL / x     / x     / x     / x                                13.2   7.84  )-----------( 248      ( 01 Oct 2018 05:46 )             39.4     01 Oct 2018 05:46    138    |  106    |  14     ----------------------------<  99     4.2     |  23     |  0.72     Ca    8.5        01 Oct 2018 05:46  Phos  4.0       01 Oct 2018 05:46  Mg     2.1       01 Oct 2018 05:46    TPro  8.1    /  Alb  3.6    /  TBili  0.7    /  DBili  x      /  AST  30     /  ALT  27     /  AlkPhos  485    30 Sep 2018 13:30  PT/INR - ( 01 Oct 2018 05:46 )   PT: 13.5 sec;   INR: 1.24 ratio    PTT - ( 01 Oct 2018 05:46 )  PTT:29.4 sec    LIVER FUNCTIONS - ( 30 Sep 2018 13:30 )  Alb: 3.6 g/dL / Pro: 8.1 gm/dL / ALK PHOS: 485 U/L / ALT: 27 U/L / AST: 30 U/L / GGT: x           Urinalysis Basic - ( 30 Sep 2018 14:57 )    Color: Yellow / Appearance: Clear / S.015 / pH: x  Gluc: x / Ketone: Negative  / Bili: Negative / Urobili: Negative mg/dL   Blood: x / Protein: Negative mg/dL / Nitrite: Negative   Leuk Esterase: Moderate / RBC: 0-2 /HPF / WBC 26-50   Sq Epi: x / Non Sq Epi: Occasional / Bacteria: Many    Culture - Urine (18 @ 14:57)    -  Amikacin: S <=8    -  Amoxicillin/Clavulanic Acid: S <=8/4    -  Ampicillin: S 4 These ampicillin results predict results for amoxicillin    -  Ampicillin/Sulbactam: S <=4/2    -  Aztreonam: S <=4    -  Cefazolin: S <=2 For uncomplicated UTI with K. pneumoniae, E. coli, or P. mirablis: NICANOR <=16 is sensitive and NICANOR >=32 is resistant. This also predicts results for oral agents cefaclor, cefdinir, cefpodoxime, cefprozil, cefuroxime axetil, cephalexin and locarbef for uncomplicated UTI. Note that some isolates may be susceptible to these agents while testing resistant to cefazolin.    -  Cefepime: S <=2    -  Cefoxitin: S <=4    -  Ceftriaxone: S <=1 Enterobacter, Citrobacter, and Serratia may develop resistance during prolonged therapy    -  Ciprofloxacin: S <=0.5    -  Ertapenem: S <=0.5    -  Gentamicin: S <=1    -  Imipenem: S <=1    -  Levofloxacin: S <=1    -  Meropenem: S <=1    -  Nitrofurantoin: S <=32 Should not be used to treat pyelonephritis    -  Piperacillin/Tazobactam: S <=8    -  Tigecycline: S <=1    -  Tobramycin: S <=2    -  Trimethoprim/Sulfamethoxazole: S <=0.5/9.5    Specimen Source: .Urine None    Culture Results:   >100,000 CFU/ml Escherichia coli    Organism Identification: Escherichia coli    Organism: Escherichia coli    Method Type: NICANOR        MEDICATIONS  (STANDING):  apixaban 2.5 milliGRAM(s) Oral every 12 hours  ATENolol  Tablet 50 milliGRAM(s) Oral daily  atorvastatin 80 milliGRAM(s) Oral at bedtime  cefTRIAXone Injectable. 1000 milliGRAM(s) IV Push every 24 hours  sodium chloride 0.9%. 500 milliLiter(s) (50 mL/Hr) IV Continuous <Continuous>    MEDICATIONS  (PRN):      RADIOLOGY & ADDITIONAL TESTS:    EXAM:  CT BRAIN                        PROCEDURE DATE:  2018      INTERPRETATION:      FINDINGS:   No previous examinations are available for review.  The brain demonstrates mild periventricular white matter ischemia.   No   acute cerebral cortical infarct is seen.  No intracranial hemorrhage is   found.  No mass effect is found in the brain.      The ventricles, sulci and basal cisterns appear unremarkable.       The orbits are unremarkable.  The paranasal sinuses are significant for   mild mucosal thickening in the BILATERAL maxillary sinuses.  The nasal   cavity appears intact.  The nasopharynx is symmetric.  The central skull   base, petrous temporal bones remain intact. The calvarium demonstrates   patchy sclerosis which may reflect Pagets disease.      IMPRESSION:   Mild periventricular white matter ischemia.    The   calvarium demonstrates patchy sclerosis which may reflect Pagets disease.      EXAM:  XR CHEST PORTABLE IMMED 1V                         PROCEDURE DATE:  2018     INTERPRETATION:      FINDINGS/  IMPRESSION:       The lungs are clear. There is no pleural effusion. There is no   pneumothorax.  The cardiac silhouette is within normal limits.  There is   osteopenia and degenerative changes.

## 2018-10-02 NOTE — PHYSICAL THERAPY INITIAL EVALUATION ADULT - CRITERIA FOR SKILLED THERAPEUTIC INTERVENTIONS
impairments found/functional limitations in following categories/rehab potential/predicted duration of therapy intervention/anticipated equipment needs at discharge/risk reduction/prevention/therapy frequency/anticipated discharge recommendation

## 2018-10-02 NOTE — CONSULT NOTE ADULT - SUBJECTIVE AND OBJECTIVE BOX
Patient is a 87y old  Female who presents with a chief complaint of c/o Dizziness      HPI:  Patient is 87-year-old with history of hypertension, chronic atrial fibrillation, mild to moderate mitral regurgitation, mild pulmonary hypertension, status post cardiac catheterization  at that time she had a nonobstructive coronary artery disease of RCA and LAD, she was admitted with complains of dizziness, but since admission she has been confused and offers no information. She has been diagnosed to have urinary tract infection. She has received intravenous fluids and intravenous antibiotics and now she is comfortable and afebrile. She is hemodynamically stable. She is alert but confused. She denies any headache or any blurred vision, dizziness, chest pain, shortness of breath or dysuria. I was asked by her daughter to evaluate the patient.                PAST MEDICAL & SURGICAL HISTORY:  Afib  CAD (coronary artery disease)  Dyslipidemia  No significant past surgical history      PREVIOUS DIAGNOSTIC TESTING:      ECHO  FINDINGS: 2015. Normal left radical ejection fraction, mild to moderate mitral regurgitation and moderate tricuspid regurgitation.        CATHETERIZATION      Nonobstructive coronary artery disease of RCA and LAD.      MEDICATIONS  (STANDING):  apixaban 2.5 milliGRAM(s) Oral every 12 hours  ATENolol  Tablet 50 milliGRAM(s) Oral daily  atorvastatin 80 milliGRAM(s) Oral at bedtime  cefTRIAXone Injectable. 1000 milliGRAM(s) IV Push every 24 hours  influenza   Vaccine 0.5 milliLiter(s) IntraMuscular once  sodium chloride 0.9%. 500 milliLiter(s) (50 mL/Hr) IV Continuous <Continuous>    MEDICATIONS  (PRN):      FAMILY HISTORY: Unremakable      SOCIAL HISTORY:  No h/o smoking aalcohol consumption.    REVIEW OF SYSTEM:  Pertinent items are noted in HPI.  Constitutional	Negative for chills, fevers, sweats.    Eyes: 	Negative for visual disturbance.  Ears, nose, mouth, throat, and face: Negative for epistaxis, nasal congestion, sore throat and tinnitus.  Neck:	Negative for enlargement, pain and difficulty in swallowing  Respiration : Negative for cough, dyspnea on exertion, pleuritic chest pain and wheezing  Cardiovascular: Negative for chest pain, dyspnea and palpitations    Gastrointestinal : Negative for abdominal pain, diarrhea, nausea and vomiting  Genitourinary: Negative for dysuria, frequency and urinary incontinence .  Skin: Negative for  rash, pruritus, swelling, dryness .  	  Hematologic/lymphatic: Negative for bleeding and easy bruising  Musculoskeletal: Negative for arthralgias, back pain and muscle weakness.  Neurological: Negative for dizziness, headaches, seizures and tremors  Behavioral/Psych: Negative for mood change, depression.  Endocrine:	Negative for blurry vision, polydipsia and polyuria, diaphoresis.   Allergic/Immunologic:	Negative for anaphylaxis, angioedema and urticaria.      Vital Signs Last 24 Hrs  T(C): 36.6 (02 Oct 2018 05:15), Max: 36.9 (01 Oct 2018 16:30)  T(F): 97.9 (02 Oct 2018 05:15), Max: 98.4 (01 Oct 2018 16:30)  HR: 76 (02 Oct 2018 07:05) (76 - 86)  BP: 134/77 (02 Oct 2018 07:05) (129/76 - 176/94)  BP(mean): --  RR: 16 (02 Oct 2018 05:15) (16 - 22)  SpO2: 95% (02 Oct 2018 05:15) (95% - 100%)    I&O's Summary    PHYSICAL EXAM  General Appearance: cooperative, no acute distress, elderly & frail  HEENT: PERRL, conjunctiva clear, EOM's intact, .  Neck: Supple, , no adenopathy, thyroid: not enlarged, no carotid bruit or JVD  Back: Symmetric, no  tenderness,no soft tissue tenderness  Lungs: Clear to auscultation bilateral,no adventitious breath sounds, normal   expiratory phase  Heart: Irregular rate and rhythm, S1, S2 normal, 1/6 holosystolic  murmur, no  rub or gallop  Abdomen: Soft, non-tender, bowel sounds active , no hepatosplenomegaly  Extremities: no cyanosis or edema, no joint swelling  Skin: Skin color, texture normal, no rashes   Neurologic: Alert confused, cranial nerves intact, sensory and motor normal,            ECG: A Fib NSSST changes        LABS:                          13.2   7.84  )-----------( 248      ( 01 Oct 2018 05:46 )             39.4     10-    138  |  106  |  14  ----------------------------<  99  4.2   |  23  |  0.72    Ca    8.5      01 Oct 2018 05:46  Phos  4.0     10-  Mg     2.1     10-    TPro  8.1  /  Alb  3.6  /  TBili  0.7  /  DBili  x   /  AST  30  /  ALT  27  /  AlkPhos  485<H>  09-30    CARDIAC MARKERS ( 30 Sep 2018 17:26 )  <0.015 ng/mL / x     / x     / x     / x      CARDIAC MARKERS ( 30 Sep 2018 13:30 )  <0.015 ng/mL / x     / x     / x     / x          Lipid Panel  162  52  86  120      PT/INR - ( 01 Oct 2018 05:46 )   PT: 13.5 sec;   INR: 1.24 ratio         PTT - ( 01 Oct 2018 05:46 )  PTT:29.4 sec  Urinalysis Basic - ( 30 Sep 2018 14:57 )    Color: Yellow / Appearance: Clear / S.015 / pH: x  Gluc: x / Ketone: Negative  / Bili: Negative / Urobili: Negative mg/dL   Blood: x / Protein: Negative mg/dL / Nitrite: Negative   Leuk Esterase: Moderate / RBC: 0-2 /HPF / WBC 26-50   Sq Epi: x / Non Sq Epi: Occasional / Bacteria: Many

## 2018-10-02 NOTE — PHYSICAL THERAPY INITIAL EVALUATION ADULT - ADDITIONAL COMMENTS
per daughter when patient is home she manages independently & is fully functional; reports mental status worse with hospitalization/altered routine

## 2018-10-02 NOTE — PROGRESS NOTE ADULT - ASSESSMENT
86 yo F with PMH significant for Afib On Eliquis, Dyslipidemia, CAD, Paget's Disease BIB daughter to the ED for eval. of Dizziness. Pt is poor historian, confused.     UTI  with Dizziness   most likely Metabolic causes.    Underlying Memory loss with early Dementia can not be ruled out.  No w/u done per daughter. Increased confusion since admitted .  Rec EEG.  Hx of Afib on Eliquis  Pt already on Eliquis.  If symptomatic consider mRI.  Correct underlying Metabolic problems.  Memory problems/ Confusion Underlying Dementia?   Base line MS not known aggrevated by UTI.  Information obtained from daughter.  F/u as needed.

## 2018-10-02 NOTE — CDI QUERY NOTE - NSCDIOTHERTXTBX_GEN_ALL_CORE_HH
Patient admitted with dizziness  Found to have a UTI  Neuro consult on 10/1 reveals :  Pt is confused and disoriented unable to provide any information.  Memory problems/ Confusion Underlying Dementia?   Base line MS not known aggravated by UTI.    Nursing documented · Orientation >disoriented to place  disoriented to time/situation   · Neuro Mentation	confused     Can  the patient's confusion and disorientation in the presence of a UTI be further clarified   a) Metabolic encephalopathy   b) Toxic-metabolic encephalopathy   c) No clinical indication of encephalopathy  d) Likely underlying dementia  e) Other clinical diagnosis, please clarify

## 2018-10-02 NOTE — PHYSICAL THERAPY INITIAL EVALUATION ADULT - GENERAL OBSERVATIONS, REHAB EVAL
pt sitting up in high back chair at nursing station for observation /socialization,has dual chair alarms in place ,awake,alert <Ox>1 ,denies dizziness ,pleasant & cooperative, reading magazine, conversant

## 2018-10-02 NOTE — PROGRESS NOTE ADULT - ASSESSMENT
88 yo F with PMH significant for Afib On Eliquis, Dyslipidemia, CAD, Paget's  admitted for generalized weakness and dizziness.     #  UTI   - BCX neg  - UCX: + E.coli, pansensitive   - c/w IV Ceftriaxone  - Tylenol prn    # Dizziness, unsteady gait,  resolved, likely due to infection and UTI  # Dehydration  -- Trop x 2 negative  - CT Head: negative for acute ICH or infarct  - S/p  IVF Ns 1L  - Oral hydration   - Fall precaution  - Neuro checks  - Vitamin B12,TSH  - Orthostatic vital signs   - Neuro eval appreciated will check MRI if symptoms persist   - PT eval        # Confusion? More  like memory loss  possibly worse due to metabolic encephalopathy?  C/w ABXS  as per Neuro will need formal outpt eval for dementia            # Hx of Paget's disease - Elevated ALP  - ALP: 485, In the past ALP was elevated too  - CT head resulted as The calvarium demonstrates patchy sclerosis which may reflect Pagets disease.  - Outpt f/u after discharge     # Hx of Afib on Eliquis  - Tele: no events, HR controlled   - c/w Eliquis  - C/w atenolol  -D/c tele     # DVT Ppx  - On Eliquis

## 2018-10-02 NOTE — CONSULT NOTE ADULT - ASSESSMENT
Dizziness was probably because of dehydration and UTI. Since admission she has been stable and asymptomatic.  Chronic atrial fibrillation.  Hypertension.  History of nonobstructive coronary artery disease, but no recent history of angina or congestive heart failure.  She is confused.  Suggest  Continue with intravenous antibiotics.  Continue with current cardiac medications.  MRI of the brain.  Discussed general condition with hospitalist and patient's daughter.  Gradual aberration.

## 2018-10-02 NOTE — PROGRESS NOTE ADULT - SUBJECTIVE AND OBJECTIVE BOX
HPI: history obtained from Chart.  88 yo F with PMH significant for Afib On Eliquis, Dyslipidemia, CAD, Paget's Disease BIB daughter to the ED for eval. of Dizziness. Pt is poor historian, confused. Daughter was at bedside earlier but not during my assessment. As per ED report, Pt was c/o dizziness this AM. No dizziness at present. In the ED, on w/u found to have UTI. Denies headache, chest pain, palpitations or SOB. Denies fever, chills, cough or dysuria, urinary urgency or frequency. Reason for Neuro evaluation unclear. Pt is confused and disoriented unable to provide any information.    10/1/18 : Pt lying in bed, still confused, denies of any focal c/o. No Dizziness or focal weakness. Per staff still confused. Thinks she is home. Afebrile. on antibiotics. Spoke with daughter who states pt was functional at her own home able to take care of herself but when she comes out gets confused  and forgetful . No work up was done and not on any meds for memory.     MEDICATIONS  (STANDING):  apixaban 2.5 milliGRAM(s) Oral every 12 hours  ATENolol  Tablet 50 milliGRAM(s) Oral daily  atorvastatin 80 milliGRAM(s) Oral at bedtime  cefTRIAXone Injectable. 1000 milliGRAM(s) IV Push every 24 hours  influenza   Vaccine 0.5 milliLiter(s) IntraMuscular once  sodium chloride 0.9%. 500 milliLiter(s) (50 mL/Hr) IV Continuous <Continuous>      Vital Signs Last 24 Hrs  T(C): 36.6 (02 Oct 2018 05:15), Max: 36.9 (01 Oct 2018 16:30)  T(F): 97.9 (02 Oct 2018 05:15), Max: 98.4 (01 Oct 2018 16:30)  HR: 76 (02 Oct 2018 07:05) (76 - 86)  BP: 134/77 (02 Oct 2018 07:05) (129/76 - 176/94)  BP(mean): --  RR: 16 (02 Oct 2018 05:15) (16 - 22)  SpO2: 95% (02 Oct 2018 05:15) (95% - 100%)    Neurological exam:  HF: A x O x 2 but not to date and events. poor historian and recall.   CN: NANDA, EOMI, VFF, facial sensation normal, no facial. gag intact.  Motor: No pronator drift, Strength 5/5 , bilat mild tremors, no cogwheel  rigidity noted.   Sens: Intact to light touch   Reflexes: Symmetric and normal . BJ 2+, BR 2+, KJ 2+, AJ 1+, downgoing toes b/l  Coord:  Not tested   Gait/Balance: Cannot test                        13.2   7.84  )-----------( 248      ( 01 Oct 2018 05:46 )             39.4     10-01    138  |  106  |  14  ----------------------------<  99  4.2   |  23  |  0.72    Ca    8.5      01 Oct 2018 05:46  Phos  4.0     10-01  Mg     2.1     10-01    TPro  8.1  /  Alb  3.6  /  TBili  0.7  /  DBili  x   /  AST  30  /  ALT  27  /  AlkPhos  485<H>  09-30      10-01 Chol 162 LDL 86 HDL 52 Trig 120    Radiology report:  - CT Head  < from: CT Head No Cont (09.30.18 @ 15:19) >  IMPRESSION:   Mild periventricular white matter ischemia.    The   calvarium demonstrates patchy sclerosis which may reflect Pagets disease.

## 2018-10-03 LAB
ANION GAP SERPL CALC-SCNC: 8 MMOL/L — SIGNIFICANT CHANGE UP (ref 5–17)
BUN SERPL-MCNC: 17 MG/DL — SIGNIFICANT CHANGE UP (ref 7–23)
CALCIUM SERPL-MCNC: 8.8 MG/DL — SIGNIFICANT CHANGE UP (ref 8.5–10.1)
CHLORIDE SERPL-SCNC: 106 MMOL/L — SIGNIFICANT CHANGE UP (ref 96–108)
CO2 SERPL-SCNC: 24 MMOL/L — SIGNIFICANT CHANGE UP (ref 22–31)
CREAT SERPL-MCNC: 0.76 MG/DL — SIGNIFICANT CHANGE UP (ref 0.5–1.3)
FOLATE SERPL-MCNC: 7.9 NG/ML — SIGNIFICANT CHANGE UP
GLUCOSE SERPL-MCNC: 95 MG/DL — SIGNIFICANT CHANGE UP (ref 70–99)
HCT VFR BLD CALC: 41 % — SIGNIFICANT CHANGE UP (ref 34.5–45)
HGB BLD-MCNC: 13.8 G/DL — SIGNIFICANT CHANGE UP (ref 11.5–15.5)
MCHC RBC-ENTMCNC: 30.7 PG — SIGNIFICANT CHANGE UP (ref 27–34)
MCHC RBC-ENTMCNC: 33.7 GM/DL — SIGNIFICANT CHANGE UP (ref 32–36)
MCV RBC AUTO: 91.3 FL — SIGNIFICANT CHANGE UP (ref 80–100)
NRBC # BLD: 0 /100 WBCS — SIGNIFICANT CHANGE UP (ref 0–0)
PLATELET # BLD AUTO: 262 K/UL — SIGNIFICANT CHANGE UP (ref 150–400)
POTASSIUM SERPL-MCNC: 4.1 MMOL/L — SIGNIFICANT CHANGE UP (ref 3.5–5.3)
POTASSIUM SERPL-SCNC: 4.1 MMOL/L — SIGNIFICANT CHANGE UP (ref 3.5–5.3)
RBC # BLD: 4.49 M/UL — SIGNIFICANT CHANGE UP (ref 3.8–5.2)
RBC # FLD: 12.9 % — SIGNIFICANT CHANGE UP (ref 10.3–14.5)
SODIUM SERPL-SCNC: 138 MMOL/L — SIGNIFICANT CHANGE UP (ref 135–145)
T4 AB SER-ACNC: 4.9 UG/DL — SIGNIFICANT CHANGE UP (ref 4.6–12)
TSH SERPL-MCNC: 5.28 UU/ML — HIGH (ref 0.34–4.82)
WBC # BLD: 7.94 K/UL — SIGNIFICANT CHANGE UP (ref 3.8–10.5)
WBC # FLD AUTO: 7.94 K/UL — SIGNIFICANT CHANGE UP (ref 3.8–10.5)

## 2018-10-03 PROCEDURE — 70551 MRI BRAIN STEM W/O DYE: CPT | Mod: 26

## 2018-10-03 PROCEDURE — 93010 ELECTROCARDIOGRAM REPORT: CPT

## 2018-10-03 RX ORDER — CEPHALEXIN 500 MG
500 CAPSULE ORAL EVERY 12 HOURS
Qty: 0 | Refills: 0 | Status: DISCONTINUED | OUTPATIENT
Start: 2018-10-03 | End: 2018-10-04

## 2018-10-03 RX ADMIN — APIXABAN 2.5 MILLIGRAM(S): 2.5 TABLET, FILM COATED ORAL at 05:56

## 2018-10-03 RX ADMIN — Medication 500 MILLIGRAM(S): at 21:05

## 2018-10-03 RX ADMIN — APIXABAN 2.5 MILLIGRAM(S): 2.5 TABLET, FILM COATED ORAL at 17:18

## 2018-10-03 RX ADMIN — ATORVASTATIN CALCIUM 80 MILLIGRAM(S): 80 TABLET, FILM COATED ORAL at 21:05

## 2018-10-03 RX ADMIN — ATENOLOL 50 MILLIGRAM(S): 25 TABLET ORAL at 05:56

## 2018-10-03 RX ADMIN — PREGABALIN 1000 MICROGRAM(S): 225 CAPSULE ORAL at 11:26

## 2018-10-03 NOTE — PROGRESS NOTE ADULT - SUBJECTIVE AND OBJECTIVE BOX
CC: c/o Dizziness (01 Oct 2018 11:32)    HPI:  88 yo F with PMH significant for Afib On Eliquis, Dyslipidemia, CAD, Paget's Disease BIB daughter to the ED for eval. of Dizziness. Pt is poor historian, confused. Daughter was at bedside earlier but not during my assessment. As per ED report, Pt was c/o dizziness this AM. No dizziness at present. In the ED, on w/u found to have UTI. Denies headache, chest pain, palpitations or SOB. Denies fever, chills, cough or dysuria, urinary urgency or frequency.         INTERVAL HPI/ OVERNIGHT EVENTS: Pt was seen and examined, poor historian, awake, alert, as per RN ambulates  well, but confused. No fevers     Vital Signs Last 24 Hrs  T(C): 36.8 (03 Oct 2018 16:56), Max: 36.8 (02 Oct 2018 20:19)  T(F): 98.2 (03 Oct 2018 16:56), Max: 98.2 (02 Oct 2018 20:19)  HR: 76 (03 Oct 2018 16:56) (60 - 78)  BP: 124/73 (03 Oct 2018 16:56) (103/47 - 133/56)  RR: 18 (03 Oct 2018 16:56) (18 - 20)  SpO2: 100% (03 Oct 2018 16:56) (98% - 100%)    REVIEW OF SYSTEMS:  Unreliable historian, but reports all negative      PHYSICAL EXAM:  General: Well developed;  in no acute distress  Eyes: PERRLA, EOMI; conjunctiva and sclera clear  Head: Normocephalic; atraumatic  ENMT: No nasal discharge; airway clear  Neck: Supple; non tender; no masses  Respiratory: Good air entry, No wheezes, rales or rhonchi  Cardiovascular: Irregular rate and rhythm. S1 and S2 Normal; No murmurs  Gastrointestinal: Soft non-tender non-distended; Normal bowel sounds  Genitourinary: No costovertebral angle tenderness  Extremities: Normal range of motion, No  edema  Vascular: Peripheral pulses palpable 2+ bilaterally  Neurological: Alert and oriented x2, non focal   Skin: Warm and dry. No acute rash  Musculoskeletal: Normal muscle  tone, without deformities  Psychiatric: Cooperative and appropriate      LABS:                         13.2   7.84  )-----------( 248      ( 01 Oct 2018 05:46 )             39.4     10-    138  |  106  |  14  ----------------------------<  99  4.2   |  23  |  0.72    Ca    8.5      01 Oct 2018 05:46  Phos  4.0     10-01  Mg     2.1     10-    PT/INR - ( 01 Oct 2018 05:46 )   PT: 13.5 sec;   INR: 1.24 ratio    PTT - ( 01 Oct 2018 05:46 )  PTT:29.4 sec      CARDIAC MARKERS ( 30 Sep 2018 17:26 )  <0.015 ng/mL / x     / x     / x     / x      CARDIAC MARKERS ( 30 Sep 2018 13:30 )  <0.015 ng/mL / x     / x     / x     / x                                13.2   7.84  )-----------( 248      ( 01 Oct 2018 05:46 )             39.4     01 Oct 2018 05:46    138    |  106    |  14     ----------------------------<  99     4.2     |  23     |  0.72     Ca    8.5        01 Oct 2018 05:46  Phos  4.0       01 Oct 2018 05:46  Mg     2.1       01 Oct 2018 05:46    TPro  8.1    /  Alb  3.6    /  TBili  0.7    /  DBili  x      /  AST  30     /  ALT  27     /  AlkPhos  485    30 Sep 2018 13:30  PT/INR - ( 01 Oct 2018 05:46 )   PT: 13.5 sec;   INR: 1.24 ratio    PTT - ( 01 Oct 2018 05:46 )  PTT:29.4 sec    LIVER FUNCTIONS - ( 30 Sep 2018 13:30 )  Alb: 3.6 g/dL / Pro: 8.1 gm/dL / ALK PHOS: 485 U/L / ALT: 27 U/L / AST: 30 U/L / GGT: x           Urinalysis Basic - ( 30 Sep 2018 14:57 )    Color: Yellow / Appearance: Clear / S.015 / pH: x  Gluc: x / Ketone: Negative  / Bili: Negative / Urobili: Negative mg/dL   Blood: x / Protein: Negative mg/dL / Nitrite: Negative   Leuk Esterase: Moderate / RBC: 0-2 /HPF / WBC 26-50   Sq Epi: x / Non Sq Epi: Occasional / Bacteria: Many    Culture - Urine (18 @ 14:57)    -  Amikacin: S <=8    -  Amoxicillin/Clavulanic Acid: S <=8/4    -  Ampicillin: S 4 These ampicillin results predict results for amoxicillin    -  Ampicillin/Sulbactam: S <=4/2    -  Aztreonam: S <=4    -  Cefazolin: S <=2 For uncomplicated UTI with K. pneumoniae, E. coli, or P. mirablis: NICANOR <=16 is sensitive and NICANOR >=32 is resistant. This also predicts results for oral agents cefaclor, cefdinir, cefpodoxime, cefprozil, cefuroxime axetil, cephalexin and locarbef for uncomplicated UTI. Note that some isolates may be susceptible to these agents while testing resistant to cefazolin.    -  Cefepime: S <=2    -  Cefoxitin: S <=4    -  Ceftriaxone: S <=1 Enterobacter, Citrobacter, and Serratia may develop resistance during prolonged therapy    -  Ciprofloxacin: S <=0.5    -  Ertapenem: S <=0.5    -  Gentamicin: S <=1    -  Imipenem: S <=1    -  Levofloxacin: S <=1    -  Meropenem: S <=1    -  Nitrofurantoin: S <=32 Should not be used to treat pyelonephritis    -  Piperacillin/Tazobactam: S <=8    -  Tigecycline: S <=1    -  Tobramycin: S <=2    -  Trimethoprim/Sulfamethoxazole: S <=0.5/9.5    Specimen Source: .Urine None    Culture Results:   >100,000 CFU/ml Escherichia coli    Organism Identification: Escherichia coli    Organism: Escherichia coli    Method Type: NICANOR        MEDICATIONS  (STANDING):  apixaban 2.5 milliGRAM(s) Oral every 12 hours  ATENolol  Tablet 50 milliGRAM(s) Oral daily  atorvastatin 80 milliGRAM(s) Oral at bedtime  cefTRIAXone Injectable. 1000 milliGRAM(s) IV Push every 24 hours  sodium chloride 0.9%. 500 milliLiter(s) (50 mL/Hr) IV Continuous <Continuous>    MEDICATIONS  (PRN):      RADIOLOGY & ADDITIONAL TESTS:    EXAM:  CT BRAIN                        PROCEDURE DATE:  2018      INTERPRETATION:      FINDINGS:   No previous examinations are available for review.  The brain demonstrates mild periventricular white matter ischemia.   No   acute cerebral cortical infarct is seen.  No intracranial hemorrhage is   found.  No mass effect is found in the brain.      The ventricles, sulci and basal cisterns appear unremarkable.       The orbits are unremarkable.  The paranasal sinuses are significant for   mild mucosal thickening in the BILATERAL maxillary sinuses.  The nasal   cavity appears intact.  The nasopharynx is symmetric.  The central skull   base, petrous temporal bones remain intact. The calvarium demonstrates   patchy sclerosis which may reflect Pagets disease.      IMPRESSION:   Mild periventricular white matter ischemia.    The   calvarium demonstrates patchy sclerosis which may reflect Pagets disease.      EXAM:  XR CHEST PORTABLE IMMED 1V                         PROCEDURE DATE:  2018     INTERPRETATION:      FINDINGS/  IMPRESSION:       The lungs are clear. There is no pleural effusion. There is no   pneumothorax.  The cardiac silhouette is within normal limits.  There is   osteopenia and degenerative changes. CC: c/o Dizziness (01 Oct 2018 11:32)    HPI:  86 yo F with PMH significant for Afib On Eliquis, Dyslipidemia, CAD, Paget's Disease BIB daughter to the ED for eval. of Dizziness. Pt is poor historian, confused. Daughter was at bedside earlier but not during my assessment. As per ED report, Pt was c/o dizziness this AM. No dizziness at present. In the ED, on w/u found to have UTI. Denies headache, chest pain, palpitations or SOB. Denies fever, chills, cough or dysuria, urinary urgency or frequency.         INTERVAL HPI/ OVERNIGHT EVENTS: Pt was seen and examined, no new complains, no change. Denies Dizziness. As per RN ambulates to the bathroom     Vital Signs Last 24 Hrs  T(C): 36.8 (03 Oct 2018 16:56), Max: 36.8 (02 Oct 2018 20:19)  T(F): 98.2 (03 Oct 2018 16:56), Max: 98.2 (02 Oct 2018 20:19)  HR: 76 (03 Oct 2018 16:56) (60 - 78)  BP: 124/73 (03 Oct 2018 16:56) (103/47 - 133/56)  RR: 18 (03 Oct 2018 16:56) (18 - 20)  SpO2: 100% (03 Oct 2018 16:56) (98% - 100%)      REVIEW OF SYSTEMS:  Unreliable historian, but reports all negative      PHYSICAL EXAM:  General: Well developed;  in no acute distress  Eyes: PERRLA, EOMI; conjunctiva and sclera clear  Head: Normocephalic; atraumatic  ENMT: No nasal discharge; airway clear  Neck: Supple; non tender; no masses  Respiratory: Good air entry, No wheezes, rales or rhonchi  Cardiovascular: Irregular rate and rhythm. S1 and S2 Normal; No murmurs  Gastrointestinal: Soft non-tender non-distended; Normal bowel sounds  Genitourinary: No costovertebral angle tenderness  Extremities: Normal range of motion, No  edema  Vascular: Peripheral pulses palpable 2+ bilaterally  Neurological: Alert and oriented x2, non focal   Skin: Warm and dry. No acute rash  Musculoskeletal: Normal muscle  tone, without deformities  Psychiatric: Cooperative and appropriate      LABS:                         13.8   7.94  )-----------( 262      ( 03 Oct 2018 07:50 )             41.0     10    138  |  106  |  17  ----------------------------<  95  4.1   |  24  |  0.76    Ca    8.8      03 Oct 2018 07:50                              13.2   7.84  )-----------( 248      ( 01 Oct 2018 05:46 )             39.4     10    138  |  106  |  14  ----------------------------<  99  4.2   |  23  |  0.72    Ca    8.5      01 Oct 2018 05:46  Phos  4.0     10-01  Mg     2.1     10-01    PT/INR - ( 01 Oct 2018 05:46 )   PT: 13.5 sec;   INR: 1.24 ratio    PTT - ( 01 Oct 2018 05:46 )  PTT:29.4 sec      CARDIAC MARKERS ( 30 Sep 2018 17:26 )  <0.015 ng/mL / x     / x     / x     / x      CARDIAC MARKERS ( 30 Sep 2018 13:30 )  <0.015 ng/mL / x     / x     / x     / x                                13.2   7.84  )-----------( 248      ( 01 Oct 2018 05:46 )             39.4     01 Oct 2018 05:46    138    |  106    |  14     ----------------------------<  99     4.2     |  23     |  0.72     Ca    8.5        01 Oct 2018 05:46  Phos  4.0       01 Oct 2018 05:46  Mg     2.1       01 Oct 2018 05:46    TPro  8.1    /  Alb  3.6    /  TBili  0.7    /  DBili  x      /  AST  30     /  ALT  27     /  AlkPhos  485    30 Sep 2018 13:30  PT/INR - ( 01 Oct 2018 05:46 )   PT: 13.5 sec;   INR: 1.24 ratio    PTT - ( 01 Oct 2018 05:46 )  PTT:29.4 sec    LIVER FUNCTIONS - ( 30 Sep 2018 13:30 )  Alb: 3.6 g/dL / Pro: 8.1 gm/dL / ALK PHOS: 485 U/L / ALT: 27 U/L / AST: 30 U/L / GGT: x           Urinalysis Basic - ( 30 Sep 2018 14:57 )    Color: Yellow / Appearance: Clear / S.015 / pH: x  Gluc: x / Ketone: Negative  / Bili: Negative / Urobili: Negative mg/dL   Blood: x / Protein: Negative mg/dL / Nitrite: Negative   Leuk Esterase: Moderate / RBC: 0-2 /HPF / WBC 26-50   Sq Epi: x / Non Sq Epi: Occasional / Bacteria: Many    Culture - Urine (18 @ 14:57)    -  Amikacin: S <=8    -  Amoxicillin/Clavulanic Acid: S <=8/4    -  Ampicillin: S 4 These ampicillin results predict results for amoxicillin    -  Ampicillin/Sulbactam: S <=4/2    -  Aztreonam: S <=4    -  Cefazolin: S <=2 For uncomplicated UTI with K. pneumoniae, E. coli, or P. mirablis: NICANOR <=16 is sensitive and NICANOR >=32 is resistant. This also predicts results for oral agents cefaclor, cefdinir, cefpodoxime, cefprozil, cefuroxime axetil, cephalexin and locarbef for uncomplicated UTI. Note that some isolates may be susceptible to these agents while testing resistant to cefazolin.    -  Cefepime: S <=2    -  Cefoxitin: S <=4    -  Ceftriaxone: S <=1 Enterobacter, Citrobacter, and Serratia may develop resistance during prolonged therapy    -  Ciprofloxacin: S <=0.5    -  Ertapenem: S <=0.5    -  Gentamicin: S <=1    -  Imipenem: S <=1    -  Levofloxacin: S <=1    -  Meropenem: S <=1    -  Nitrofurantoin: S <=32 Should not be used to treat pyelonephritis    -  Piperacillin/Tazobactam: S <=8    -  Tigecycline: S <=1    -  Tobramycin: S <=2    -  Trimethoprim/Sulfamethoxazole: S <=0.5/9.5    Specimen Source: .Urine None    Culture Results:   >100,000 CFU/ml Escherichia coli    Organism Identification: Escherichia coli    Organism: Escherichia coli    Method Type: NICANOR        MEDICATIONS  (STANDING):  apixaban 2.5 milliGRAM(s) Oral every 12 hours  ATENolol  Tablet 50 milliGRAM(s) Oral daily  atorvastatin 80 milliGRAM(s) Oral at bedtime  cefTRIAXone Injectable. 1000 milliGRAM(s) IV Push every 24 hours  sodium chloride 0.9%. 500 milliLiter(s) (50 mL/Hr) IV Continuous <Continuous>    MEDICATIONS  (PRN):      RADIOLOGY & ADDITIONAL TESTS:    EXAM:  CT BRAIN                        PROCEDURE DATE:  2018      INTERPRETATION:      FINDINGS:   No previous examinations are available for review.  The brain demonstrates mild periventricular white matter ischemia.   No   acute cerebral cortical infarct is seen.  No intracranial hemorrhage is   found.  No mass effect is found in the brain.      The ventricles, sulci and basal cisterns appear unremarkable.       The orbits are unremarkable.  The paranasal sinuses are significant for   mild mucosal thickening in the BILATERAL maxillary sinuses.  The nasal   cavity appears intact.  The nasopharynx is symmetric.  The central skull   base, petrous temporal bones remain intact. The calvarium demonstrates   patchy sclerosis which may reflect Pagets disease.      IMPRESSION:   Mild periventricular white matter ischemia.    The   calvarium demonstrates patchy sclerosis which may reflect Pagets disease.      EXAM:  XR CHEST PORTABLE IMMED 1V                         PROCEDURE DATE:  2018     INTERPRETATION:      FINDINGS/  IMPRESSION:       The lungs are clear. There is no pleural effusion. There is no   pneumothorax.  The cardiac silhouette is within normal limits.  There is   osteopenia and degenerative changes.       EXAM:  EEG-AWAKE AND DROWSY    PROCEDURE DATE:  10/02/2018        INTERPRETATION:   EEG # 18 - [id number]        DOS : 10/2/18      AGE : 87        S : F       LOCATION : 5 E    Referring Physician : Dr. KOSTAS Blanchard             Reviewing physician :   DR. KOSTAS Blanchard    This is 17-channel EEG recording for this 87-year-old patient with the   history of UTI and altered mental status. Patient awake, follows   commands, confused and relaxed. Medications include Apixaban, atenolol,   atorvastatin.    The background activity consists of bilaterally symmetrical approximately   predominant 9-10 Hz 15-30 uV activity which attenuates with eye opening.   Bilaterally diffuse 15-20 Hz low voltage beta activity seen anteriorly.    There is no sleep or drowsiness noted in the recording.    Hyperventilation was not performed. Stepped photic stimulation did not   demonstrate any abnormalities.    Intermittent muscle and electrical artifacts did obscured the recording.    No focal slowing or epileptiform activity noted.    Impression: This is a normal awake EEG. Clinical correlation recommended.

## 2018-10-03 NOTE — PROGRESS NOTE ADULT - ASSESSMENT
86 yo F with PMH significant for Afib On Eliquis, Dyslipidemia, CAD, Paget's  admitted for generalized weakness and dizziness.     #  UTI   - BCX neg  - UCX: + E.coli, pansensitive   - c/w IV Ceftriaxone  - Tylenol prn    # Dizziness, unsteady gait,  resolved, likely due to infection and UTI  # Dehydration  -- Trop x 2 negative  - CT Head: negative for acute ICH or infarct  - S/p  IVF Ns 1L  - Oral hydration   - Fall precaution  - Neuro checks  - Vitamin B12,TSH  - Orthostatic vital signs   - Neuro eval appreciated will check MRI if symptoms persist   - PT eval        # Confusion? More  like memory loss  possibly worse due to metabolic encephalopathy?  C/w ABXS  as per Neuro will need formal outpt eval for dementia            # Hx of Paget's disease - Elevated ALP  - ALP: 485, In the past ALP was elevated too  - CT head resulted as The calvarium demonstrates patchy sclerosis which may reflect Pagets disease.  - Outpt f/u after discharge     # Hx of Afib on Eliquis  - Tele: no events, HR controlled   - c/w Eliquis  - C/w atenolol  -D/c tele     # DVT Ppx  - On Eliquis 86 yo F with PMH significant for Afib On Eliquis, Dyslipidemia, CAD, Paget's  admitted for generalized weakness and dizziness.     #  UTI   - BCX neg  - UCX: + E.coli, pansensitive   - On IV Ceftriaxone #3, change for PO Kelflex   - Tylenol prn    # Dizziness, unsteady gait,  resolved, likely due to infection and UTI  # Dehydration, resolved   -- Trop x 2 negative  - CT Head: negative for acute ICH or infarct  - S/p  IVF Ns 1L  - Oral hydration   - Fall precaution  - Vitamin B12, borderline low, will supplement PO   - TSH elevated, but T4 WNL, will need to repeat in 4 weeks   -PT      # Confusion? More  like memory loss  possibly worse due to metabolic encephalopathy?  C/w ABXS  as per Neuro will need formal outpt eval for dementia  D/w daughter, lived alone and Fx, but gets confused when off her routine  Will order MRI to r/o CVA        # Hx of Paget's disease - Elevated ALP  - ALP: 485, In the past ALP was elevated too  - CT head resulted as The calvarium demonstrates patchy sclerosis which may reflect Pagets disease.  - Outpt f/u after discharge     # Hx of Afib on Eliquis  - Tele: no events, HR controlled   - c/w Eliquis  - C/w atenolol      # DVT Ppx  - On Eliquis    Dispo: MRI, d/c planning

## 2018-10-04 ENCOUNTER — TRANSCRIPTION ENCOUNTER (OUTPATIENT)
Age: 83
End: 2018-10-04

## 2018-10-04 VITALS
OXYGEN SATURATION: 98 % | HEART RATE: 78 BPM | RESPIRATION RATE: 18 BRPM | TEMPERATURE: 99 F | DIASTOLIC BLOOD PRESSURE: 75 MMHG | SYSTOLIC BLOOD PRESSURE: 119 MMHG

## 2018-10-04 RX ORDER — CEPHALEXIN 500 MG
1 CAPSULE ORAL
Qty: 8 | Refills: 0
Start: 2018-10-04 | End: 2018-10-07

## 2018-10-04 RX ORDER — CEPHALEXIN 500 MG
1 CAPSULE ORAL
Qty: 8 | Refills: 0 | OUTPATIENT
Start: 2018-10-04 | End: 2018-10-07

## 2018-10-04 RX ORDER — PREGABALIN 225 MG/1
1 CAPSULE ORAL
Qty: 0 | Refills: 0 | DISCHARGE
Start: 2018-10-04

## 2018-10-04 RX ADMIN — Medication 500 MILLIGRAM(S): at 06:01

## 2018-10-04 RX ADMIN — PREGABALIN 1000 MICROGRAM(S): 225 CAPSULE ORAL at 12:10

## 2018-10-04 RX ADMIN — ATENOLOL 50 MILLIGRAM(S): 25 TABLET ORAL at 06:01

## 2018-10-04 RX ADMIN — APIXABAN 2.5 MILLIGRAM(S): 2.5 TABLET, FILM COATED ORAL at 06:00

## 2018-10-04 NOTE — DISCHARGE NOTE ADULT - CARE PROVIDER_API CALL
Reyes Pena), Medicine  [address] [place] [address]  Phone: [phone]  Fax: [phone]    Mahi Blanchard), Neurology  General  [address] [place] [address]  [address] [place] [address]  Phone: [phone]  Fax: [phone]

## 2018-10-04 NOTE — DISCHARGE NOTE ADULT - CARE PROVIDERS DIRECT ADDRESSES
,DirectAddress_Unknown,[email or web address]

## 2018-10-04 NOTE — DISCHARGE NOTE ADULT - PATIENT PORTAL LINK FT
You can access the [facility] Portal, offered by [facility], by registering with the following website: [email or web address]

## 2018-10-04 NOTE — DISCHARGE NOTE ADULT - CONDITIONS AT DISCHARGE
Patient prior assessment remain the same, no distress noted. All discharge instruction given and patient given feedback.

## 2018-10-04 NOTE — DISCHARGE NOTE ADULT - PLAN OF CARE
resolve complete oral abxs f/u with neurologist for further evaluation F/u with PCP to repeat test in 4 weeks

## 2018-10-04 NOTE — DISCHARGE NOTE ADULT - MEDICATION SUMMARY - MEDICATIONS TO TAKE
I will START or STAY ON the medications listed below when I get home from the hospital:    Eliquis  -- 2.5 milligram(s) by mouth once a day (at bedtime)  -- Indication: For Afib    Crestor 20 mg oral tablet  -- 1 tab(s) by mouth once a day (at bedtime)  -- Indication: For HLD    atenolol 50 mg oral tablet  -- 1 tab(s) by mouth once a day  -- Indication: For Afib    cephalexin 500 mg oral capsule  -- 1 cap(s) by mouth every 12 hours  -- Indication: For UTI    cyanocobalamin 1000 mcg oral tablet  -- 1 tab(s) by mouth once a day  -- Indication: For Low vit B12

## 2018-10-04 NOTE — DISCHARGE NOTE ADULT - CARE PLAN
Principal Discharge DX:	Urinary tract infection  Goal:	resolve  Assessment and plan of treatment:	complete oral abxs  Secondary Diagnosis:	Memory deficit  Assessment and plan of treatment:	f/u with neurologist for further evaluation  Secondary Diagnosis:	Abnormal thyroid function test  Assessment and plan of treatment:	F/u with PCP to repeat test in 4 weeks

## 2018-10-04 NOTE — DISCHARGE NOTE ADULT - HOSPITAL COURSE
86 yo F with PMH significant for Afib On Eliquis, Dyslipidemia, CAD, Paget's Disease BIB daughter to the ED for eval. of Dizziness. Pt is poor historian, confused. Daughter was at bedside earlier but not during my assessment. As per ED report, Pt was c/o dizziness this AM. No dizziness at present. In the ED, on w/u found to have UTI. Denies headache, chest pain, palpitations or SOB. Denies fever, chills, cough or dysuria, urinary urgency or frequency.   PHYSICAL EXAM:  General: Well developed;  in no acute distress  Eyes: PERRLA, EOMI; conjunctiva and sclera clear  Head: Normocephalic; atraumatic  ENMT: No nasal discharge; airway clear  Neck: Supple; non tender; no masses  Respiratory: Good air entry, No wheezes, rales or rhonchi  Cardiovascular: Irregular rate and rhythm. S1 and S2 Normal; No murmurs  Gastrointestinal: Soft non-tender non-distended; Normal bowel sounds  Genitourinary: No costovertebral angle tenderness  Extremities: Normal range of motion, No  edema  Vascular: Peripheral pulses palpable 2+ bilaterally  Neurological: Alert and oriented x2, non focal   Skin: Warm and dry. No acute rash  Musculoskeletal: Normal muscle  tone, without deformities  Psychiatric: Cooperative and appropriate    86 yo F with PMH significant for Afib On Eliquis, Dyslipidemia, CAD, Paget's  admitted for generalized weakness and dizziness.     #  UTI   - BCX neg  - UCX: + E.coli, pansensitive   - On IV Ceftriaxone #3, change for PO Kelflex   - Tylenol prn    # Dizziness, unsteady gait,  resolved, likely due to infection and UTI  # Dehydration, resolved   -- Trop x 2 negative  - CT Head: negative for acute ICH or infarct  - S/p  IVF Ns 1L  - Oral hydration   - Fall precaution  - Vitamin B12, borderline low, will supplement PO   - TSH elevated, but T4 WNL, will need to repeat in 4 weeks   -PT      # Confusion? More  like memory loss  possibly worse due to metabolic encephalopathy?  C/w ABXS  as per Neuro will need formal outpt eval for dementia  D/w daughter, lived alone and Fx, but gets confused when off her routine  Will order MRI to r/o CVA        # Hx of Paget's disease - Elevated ALP  - ALP: 485, In the past ALP was elevated too  - CT head resulted as The calvarium demonstrates patchy sclerosis which may reflect Pagets disease.  - Outpt f/u after discharge     # Hx of Afib on Eliquis  - Tele: no events, HR controlled   - c/w Eliquis  - C/w atenolol      # DVT Ppx  - On Eliquis 86 yo F with PMH significant for Afib On Eliquis, Dyslipidemia, CAD, Paget's Disease BIB daughter to the ED for eval. of Dizziness, unsteady gait and weakness. As per daughter, Pt was not able to get up from chair to walk. No fevers or other complains.  Pt is poor historian, confused.  As per daughter Daughter  hes some memory issues but never officially evaluated or with dementia. Pt becomes confused when out of her normal routine. In the ED, on w/u found to have UTI.  Received IVF and started on IV Abxs. Pts Dizziness and weakness resolved with hydration and Abxs, Pt still forgetful likely has underline Dementia. EEG and CT head MRI neg for stroke. Today reports no complains, states feels little tired, but wants to go home.  D/c planning was d/w daughter and team   Vital Signs Last 24 Hrs  T(C): 37 (04 Oct 2018 13:01), Max: 37 (04 Oct 2018 13:01)  T(F): 98.6 (04 Oct 2018 13:01), Max: 98.6 (04 Oct 2018 13:01)  HR: 78 (04 Oct 2018 13:01) (70 - 86)  BP: 119/75 (04 Oct 2018 13:01) (112/73 - 124/73)  RR: 18 (04 Oct 2018 13:01) (18 - 18)  SpO2: 98% (04 Oct 2018 13:01) (98% - 100%)    PHYSICAL EXAM:  General: Well developed;  in no acute distress  Eyes: PERRLA, EOMI; conjunctiva and sclera clear  Head: Normocephalic; atraumatic  ENMT: No nasal discharge; airway clear  Neck: Supple; non tender; no masses  Respiratory: Good air entry, No wheezes, rales or rhonchi  Cardiovascular: Irregular rate and rhythm. S1 and S2 Normal; No murmurs  Gastrointestinal: Soft non-tender non-distended; Normal bowel sounds  Genitourinary: No costovertebral angle tenderness  Extremities: Normal range of motion, No  edema  Vascular: Peripheral pulses palpable 2+ bilaterally  Neurological: Alert and oriented x2, non focal   Skin: Warm and dry. No acute rash  Musculoskeletal: Normal muscle  tone, without deformities  Psychiatric: Cooperative and appropriate    A/p:      #  UTI   - BCX neg  - UCX: + E.coli, pansensitive   - On IV Ceftriaxone #3, changed  for PO Kelflex   - Tylenol prn    # Dizziness, unsteady gait,  resolved, likely due to infection and  Dehydration, resolved   -- Trop x 2 negative  - CT Head: negative for acute ICH or infarct  - S/p  IVF Ns 1L  - Oral hydration   - Fall precaution  - Vitamin B12, borderline low, will supplement PO   - TSH elevated, but T4 WNL, will need to repeat in 4 weeks   -PT      # Confusion? More  like memory loss  possibly worse due to metabolic encephalopathy  as per Neuro will need formal outpt eval for dementia  EEG normal  MRI: neg for acute CVA, has chronic microvascular changes   D/w daughter, Pt lives alone and Fx, but gets confused when off her routine  Daughter will be staying with Pt after d/c and will look et private hire if no improvement       # Hx of Paget's disease - Elevated ALP  - ALP: 485, In the past ALP was elevated too  - CT head resulted as The calvarium demonstrates patchy sclerosis which may reflect Pagets disease.  - Outpt f/u after discharge     # Hx of Afib on Eliquis  - Tele: no events, HR controlled   - c/w Eliquis  - C/w atenolol      Dispo: d/c home with HC today

## 2018-10-06 LAB
CULTURE RESULTS: SIGNIFICANT CHANGE UP
CULTURE RESULTS: SIGNIFICANT CHANGE UP
SPECIMEN SOURCE: SIGNIFICANT CHANGE UP
SPECIMEN SOURCE: SIGNIFICANT CHANGE UP

## 2018-10-09 DIAGNOSIS — N39.0 URINARY TRACT INFECTION, SITE NOT SPECIFIED: ICD-10-CM

## 2018-10-09 DIAGNOSIS — I25.10 ATHEROSCLEROTIC HEART DISEASE OF NATIVE CORONARY ARTERY WITHOUT ANGINA PECTORIS: ICD-10-CM

## 2018-10-09 DIAGNOSIS — E78.5 HYPERLIPIDEMIA, UNSPECIFIED: ICD-10-CM

## 2018-10-09 DIAGNOSIS — Z79.01 LONG TERM (CURRENT) USE OF ANTICOAGULANTS: ICD-10-CM

## 2018-10-09 DIAGNOSIS — E86.0 DEHYDRATION: ICD-10-CM

## 2018-10-09 DIAGNOSIS — R42 DIZZINESS AND GIDDINESS: ICD-10-CM

## 2018-10-09 DIAGNOSIS — M88.9 OSTEITIS DEFORMANS OF UNSPECIFIED BONE: ICD-10-CM

## 2018-10-09 DIAGNOSIS — G93.41 METABOLIC ENCEPHALOPATHY: ICD-10-CM

## 2018-10-09 DIAGNOSIS — I48.91 UNSPECIFIED ATRIAL FIBRILLATION: ICD-10-CM

## 2019-07-31 NOTE — PATIENT PROFILE ADULT. - PURPOSEFUL PROACTIVE ROUNDING
lower lung field scarring is redemonstrated. Chronic appearing pleural-parenchymal thickening along the costophrenic angles. No consolidation, effusion or pneumothorax. Heart size is within normal limits. Calcification of the aortic knob. Sternotomy wires are redemonstrated. Osseous structures grossly intact. Stable chest without acute process and chronic findings as described. Physical Exam:    General appearance - NAD, AOx 3  Lungs -CTA Bilat  Heart - RRR no murmurs  Abdomen - Soft NT/ND  Neurological:  No focal motor deficit, sensory loss  Extremities - Cap refil <2 sec in all ext. Skin -warm, dry      Hospital Course:  Clinical course has improved, labs and imaging reviewed. Jennifer Mccullough originally presented to the hospital on 7/26/2019 12:00 PM. with acute right-sided chest pain. He was admitted for observation and rule out of myocardial ischemia, and it was determined that he has a highly suspicious VQ scan diagnostic of a pulmonary embolism. Labs and imaging were followed daily. Imaging results as above. Due to acuteness and severity of condition  He requires a higher level of care. Disposition: Transfer    Patient is transferred to internal medicine teaching's service.   Accepting physician is Dr. Ashlyn Amaro    Condition: Stable    Time Spent: 0 day    --  Akua Leggett MD   Emergency Medicine Resident Physician, PGY-1
Patient

## 2024-04-11 ENCOUNTER — INPATIENT (INPATIENT)
Facility: HOSPITAL | Age: 89
LOS: 3 days | Discharge: NURSING, REHAB OR LONG-TERM CARE | DRG: 689 | End: 2024-04-15
Attending: HOSPITALIST | Admitting: HOSPITALIST
Payer: MEDICARE

## 2024-04-11 VITALS
SYSTOLIC BLOOD PRESSURE: 137 MMHG | OXYGEN SATURATION: 100 % | RESPIRATION RATE: 17 BRPM | HEIGHT: 64 IN | HEART RATE: 87 BPM | WEIGHT: 130.95 LBS | DIASTOLIC BLOOD PRESSURE: 80 MMHG | TEMPERATURE: 98 F

## 2024-04-11 DIAGNOSIS — S09.90XA UNSPECIFIED INJURY OF HEAD, INITIAL ENCOUNTER: ICD-10-CM

## 2024-04-11 PROBLEM — I48.91 UNSPECIFIED ATRIAL FIBRILLATION: Chronic | Status: ACTIVE | Noted: 2018-10-02

## 2024-04-11 PROBLEM — I25.10 ATHEROSCLEROTIC HEART DISEASE OF NATIVE CORONARY ARTERY WITHOUT ANGINA PECTORIS: Chronic | Status: ACTIVE | Noted: 2018-09-30

## 2024-04-11 PROBLEM — E78.5 HYPERLIPIDEMIA, UNSPECIFIED: Chronic | Status: ACTIVE | Noted: 2018-09-30

## 2024-04-11 LAB
ABO RH CONFIRMATION: SIGNIFICANT CHANGE UP
ALBUMIN SERPL ELPH-MCNC: 3.2 G/DL — LOW (ref 3.3–5)
ALP SERPL-CCNC: 435 U/L — HIGH (ref 40–120)
ALT FLD-CCNC: 17 U/L — SIGNIFICANT CHANGE UP (ref 12–78)
ANION GAP SERPL CALC-SCNC: 5 MMOL/L — SIGNIFICANT CHANGE UP (ref 5–17)
APPEARANCE UR: CLEAR — SIGNIFICANT CHANGE UP
APTT BLD: 30.4 SEC — SIGNIFICANT CHANGE UP (ref 24.5–35.6)
AST SERPL-CCNC: 28 U/L — SIGNIFICANT CHANGE UP (ref 15–37)
BACTERIA # UR AUTO: ABNORMAL /HPF
BASOPHILS # BLD AUTO: 0.03 K/UL — SIGNIFICANT CHANGE UP (ref 0–0.2)
BASOPHILS NFR BLD AUTO: 0.2 % — SIGNIFICANT CHANGE UP (ref 0–2)
BILIRUB SERPL-MCNC: 0.6 MG/DL — SIGNIFICANT CHANGE UP (ref 0.2–1.2)
BILIRUB UR-MCNC: NEGATIVE — SIGNIFICANT CHANGE UP
BLD GP AB SCN SERPL QL: SIGNIFICANT CHANGE UP
BUN SERPL-MCNC: 32 MG/DL — HIGH (ref 7–23)
CALCIUM SERPL-MCNC: 9.2 MG/DL — SIGNIFICANT CHANGE UP (ref 8.5–10.1)
CAST: 2 /LPF — SIGNIFICANT CHANGE UP (ref 0–4)
CHLORIDE SERPL-SCNC: 107 MMOL/L — SIGNIFICANT CHANGE UP (ref 96–108)
CK SERPL-CCNC: 541 U/L — HIGH (ref 26–192)
CO2 SERPL-SCNC: 26 MMOL/L — SIGNIFICANT CHANGE UP (ref 22–31)
COLOR SPEC: YELLOW — SIGNIFICANT CHANGE UP
CREAT SERPL-MCNC: 0.89 MG/DL — SIGNIFICANT CHANGE UP (ref 0.5–1.3)
DIFF PNL FLD: ABNORMAL
EGFR: 61 ML/MIN/1.73M2 — SIGNIFICANT CHANGE UP
EOSINOPHIL # BLD AUTO: 0 K/UL — SIGNIFICANT CHANGE UP (ref 0–0.5)
EOSINOPHIL NFR BLD AUTO: 0 % — SIGNIFICANT CHANGE UP (ref 0–6)
GLUCOSE SERPL-MCNC: 108 MG/DL — HIGH (ref 70–99)
GLUCOSE UR QL: NEGATIVE MG/DL — SIGNIFICANT CHANGE UP
HCT VFR BLD CALC: 47.6 % — HIGH (ref 34.5–45)
HGB BLD-MCNC: 15.9 G/DL — HIGH (ref 11.5–15.5)
IMM GRANULOCYTES NFR BLD AUTO: 0.5 % — SIGNIFICANT CHANGE UP (ref 0–0.9)
INR BLD: 1.27 RATIO — HIGH (ref 0.85–1.18)
KETONES UR-MCNC: NEGATIVE MG/DL — SIGNIFICANT CHANGE UP
LEUKOCYTE ESTERASE UR-ACNC: ABNORMAL
LYMPHOCYTES # BLD AUTO: 1.76 K/UL — SIGNIFICANT CHANGE UP (ref 1–3.3)
LYMPHOCYTES # BLD AUTO: 10.5 % — LOW (ref 13–44)
MCHC RBC-ENTMCNC: 30.4 PG — SIGNIFICANT CHANGE UP (ref 27–34)
MCHC RBC-ENTMCNC: 33.4 GM/DL — SIGNIFICANT CHANGE UP (ref 32–36)
MCV RBC AUTO: 91 FL — SIGNIFICANT CHANGE UP (ref 80–100)
MONOCYTES # BLD AUTO: 1.04 K/UL — HIGH (ref 0–0.9)
MONOCYTES NFR BLD AUTO: 6.2 % — SIGNIFICANT CHANGE UP (ref 2–14)
NEUTROPHILS # BLD AUTO: 13.88 K/UL — HIGH (ref 1.8–7.4)
NEUTROPHILS NFR BLD AUTO: 82.6 % — HIGH (ref 43–77)
NITRITE UR-MCNC: POSITIVE
PH UR: 5 — SIGNIFICANT CHANGE UP (ref 5–8)
PLATELET # BLD AUTO: 370 K/UL — SIGNIFICANT CHANGE UP (ref 150–400)
POTASSIUM SERPL-MCNC: 4.2 MMOL/L — SIGNIFICANT CHANGE UP (ref 3.5–5.3)
POTASSIUM SERPL-SCNC: 4.2 MMOL/L — SIGNIFICANT CHANGE UP (ref 3.5–5.3)
PROT SERPL-MCNC: 7.5 GM/DL — SIGNIFICANT CHANGE UP (ref 6–8.3)
PROT UR-MCNC: 30 MG/DL
PROTHROM AB SERPL-ACNC: 14.2 SEC — HIGH (ref 9.5–13)
RBC # BLD: 5.23 M/UL — HIGH (ref 3.8–5.2)
RBC # FLD: 12.5 % — SIGNIFICANT CHANGE UP (ref 10.3–14.5)
RBC CASTS # UR COMP ASSIST: 2 /HPF — SIGNIFICANT CHANGE UP (ref 0–4)
SODIUM SERPL-SCNC: 138 MMOL/L — SIGNIFICANT CHANGE UP (ref 135–145)
SP GR SPEC: 1.03 — SIGNIFICANT CHANGE UP (ref 1–1.03)
SQUAMOUS # UR AUTO: 2 /HPF — SIGNIFICANT CHANGE UP (ref 0–5)
TROPONIN I, HIGH SENSITIVITY RESULT: 27.66 NG/L — SIGNIFICANT CHANGE UP
UROBILINOGEN FLD QL: 1 MG/DL — SIGNIFICANT CHANGE UP (ref 0.2–1)
WBC # BLD: 16.8 K/UL — HIGH (ref 3.8–10.5)
WBC # FLD AUTO: 16.8 K/UL — HIGH (ref 3.8–10.5)
WBC UR QL: 75 /HPF — HIGH (ref 0–5)

## 2024-04-11 PROCEDURE — 87086 URINE CULTURE/COLONY COUNT: CPT

## 2024-04-11 PROCEDURE — 36415 COLL VENOUS BLD VENIPUNCTURE: CPT

## 2024-04-11 PROCEDURE — 99285 EMERGENCY DEPT VISIT HI MDM: CPT

## 2024-04-11 PROCEDURE — 97116 GAIT TRAINING THERAPY: CPT | Mod: GP

## 2024-04-11 PROCEDURE — 80053 COMPREHEN METABOLIC PANEL: CPT

## 2024-04-11 PROCEDURE — 85025 COMPLETE CBC W/AUTO DIFF WBC: CPT

## 2024-04-11 PROCEDURE — 85027 COMPLETE CBC AUTOMATED: CPT

## 2024-04-11 PROCEDURE — 87186 SC STD MICRODIL/AGAR DIL: CPT

## 2024-04-11 PROCEDURE — 99223 1ST HOSP IP/OBS HIGH 75: CPT

## 2024-04-11 PROCEDURE — 70450 CT HEAD/BRAIN W/O DYE: CPT | Mod: 26,MC

## 2024-04-11 PROCEDURE — 81001 URINALYSIS AUTO W/SCOPE: CPT

## 2024-04-11 PROCEDURE — 97163 PT EVAL HIGH COMPLEX 45 MIN: CPT | Mod: GP

## 2024-04-11 PROCEDURE — 71250 CT THORAX DX C-: CPT | Mod: 26,MC

## 2024-04-11 PROCEDURE — 72125 CT NECK SPINE W/O DYE: CPT | Mod: 26,MC

## 2024-04-11 PROCEDURE — 99497 ADVNCD CARE PLAN 30 MIN: CPT | Mod: 25

## 2024-04-11 PROCEDURE — 76376 3D RENDER W/INTRP POSTPROCES: CPT | Mod: 26

## 2024-04-11 PROCEDURE — 85610 PROTHROMBIN TIME: CPT

## 2024-04-11 PROCEDURE — 82550 ASSAY OF CK (CPK): CPT

## 2024-04-11 PROCEDURE — 80048 BASIC METABOLIC PNL TOTAL CA: CPT

## 2024-04-11 PROCEDURE — 74176 CT ABD & PELVIS W/O CONTRAST: CPT | Mod: 26,MC

## 2024-04-11 PROCEDURE — 70486 CT MAXILLOFACIAL W/O DYE: CPT | Mod: 26,MC

## 2024-04-11 PROCEDURE — 85730 THROMBOPLASTIN TIME PARTIAL: CPT

## 2024-04-11 RX ORDER — APIXABAN 2.5 MG/1
2.5 TABLET, FILM COATED ORAL
Qty: 0 | Refills: 0 | DISCHARGE

## 2024-04-11 RX ORDER — TETANUS TOXOID, REDUCED DIPHTHERIA TOXOID AND ACELLULAR PERTUSSIS VACCINE, ADSORBED 5; 2.5; 8; 8; 2.5 [IU]/.5ML; [IU]/.5ML; UG/.5ML; UG/.5ML; UG/.5ML
0.5 SUSPENSION INTRAMUSCULAR ONCE
Refills: 0 | Status: COMPLETED | OUTPATIENT
Start: 2024-04-11 | End: 2024-04-11

## 2024-04-11 RX ORDER — APIXABAN 2.5 MG/1
2.5 TABLET, FILM COATED ORAL EVERY 12 HOURS
Refills: 0 | Status: DISCONTINUED | OUTPATIENT
Start: 2024-04-11 | End: 2024-04-15

## 2024-04-11 RX ORDER — METOPROLOL TARTRATE 50 MG
1 TABLET ORAL
Refills: 0 | DISCHARGE

## 2024-04-11 RX ORDER — SODIUM CHLORIDE 9 MG/ML
1000 INJECTION INTRAMUSCULAR; INTRAVENOUS; SUBCUTANEOUS
Refills: 0 | Status: COMPLETED | OUTPATIENT
Start: 2024-04-11 | End: 2024-04-11

## 2024-04-11 RX ORDER — SODIUM CHLORIDE 9 MG/ML
3 INJECTION INTRAMUSCULAR; INTRAVENOUS; SUBCUTANEOUS ONCE
Refills: 0 | Status: COMPLETED | OUTPATIENT
Start: 2024-04-11 | End: 2024-04-11

## 2024-04-11 RX ORDER — ACETAMINOPHEN 500 MG
650 TABLET ORAL EVERY 6 HOURS
Refills: 0 | Status: DISCONTINUED | OUTPATIENT
Start: 2024-04-11 | End: 2024-04-15

## 2024-04-11 RX ORDER — ATENOLOL 25 MG/1
1 TABLET ORAL
Qty: 0 | Refills: 0 | DISCHARGE

## 2024-04-11 RX ORDER — CHOLECALCIFEROL (VITAMIN D3) 125 MCG
1000 CAPSULE ORAL DAILY
Refills: 0 | Status: DISCONTINUED | OUTPATIENT
Start: 2024-04-11 | End: 2024-04-15

## 2024-04-11 RX ORDER — ONDANSETRON 8 MG/1
4 TABLET, FILM COATED ORAL EVERY 6 HOURS
Refills: 0 | Status: DISCONTINUED | OUTPATIENT
Start: 2024-04-11 | End: 2024-04-15

## 2024-04-11 RX ORDER — LANOLIN ALCOHOL/MO/W.PET/CERES
3 CREAM (GRAM) TOPICAL AT BEDTIME
Refills: 0 | Status: DISCONTINUED | OUTPATIENT
Start: 2024-04-11 | End: 2024-04-15

## 2024-04-11 RX ORDER — METOPROLOL TARTRATE 50 MG
25 TABLET ORAL DAILY
Refills: 0 | Status: DISCONTINUED | OUTPATIENT
Start: 2024-04-11 | End: 2024-04-15

## 2024-04-11 RX ORDER — BACITRACIN ZINC 500 UNIT/G
1 OINTMENT IN PACKET (EA) TOPICAL
Refills: 0 | Status: DISCONTINUED | OUTPATIENT
Start: 2024-04-11 | End: 2024-04-15

## 2024-04-11 RX ORDER — ATORVASTATIN CALCIUM 80 MG/1
80 TABLET, FILM COATED ORAL AT BEDTIME
Refills: 0 | Status: DISCONTINUED | OUTPATIENT
Start: 2024-04-11 | End: 2024-04-15

## 2024-04-11 RX ORDER — ACETAMINOPHEN 500 MG
650 TABLET ORAL EVERY 6 HOURS
Refills: 0 | Status: DISCONTINUED | OUTPATIENT
Start: 2024-04-11 | End: 2024-04-11

## 2024-04-11 RX ORDER — APIXABAN 2.5 MG/1
1 TABLET, FILM COATED ORAL
Refills: 0 | DISCHARGE

## 2024-04-11 RX ORDER — CHOLECALCIFEROL (VITAMIN D3) 125 MCG
1 CAPSULE ORAL
Refills: 0 | DISCHARGE

## 2024-04-11 RX ORDER — SODIUM CHLORIDE 9 MG/ML
500 INJECTION INTRAMUSCULAR; INTRAVENOUS; SUBCUTANEOUS ONCE
Refills: 0 | Status: COMPLETED | OUTPATIENT
Start: 2024-04-11 | End: 2024-04-11

## 2024-04-11 RX ORDER — ROSUVASTATIN CALCIUM 5 MG/1
1 TABLET ORAL
Qty: 0 | Refills: 0 | DISCHARGE

## 2024-04-11 RX ORDER — CEFTRIAXONE 500 MG/1
1000 INJECTION, POWDER, FOR SOLUTION INTRAMUSCULAR; INTRAVENOUS EVERY 24 HOURS
Refills: 0 | Status: COMPLETED | OUTPATIENT
Start: 2024-04-11 | End: 2024-04-13

## 2024-04-11 RX ADMIN — Medication 3 MILLIGRAM(S): at 21:29

## 2024-04-11 RX ADMIN — TETANUS TOXOID, REDUCED DIPHTHERIA TOXOID AND ACELLULAR PERTUSSIS VACCINE, ADSORBED 0.5 MILLILITER(S): 5; 2.5; 8; 8; 2.5 SUSPENSION INTRAMUSCULAR at 10:48

## 2024-04-11 RX ADMIN — SODIUM CHLORIDE 1000 MILLILITER(S): 9 INJECTION INTRAMUSCULAR; INTRAVENOUS; SUBCUTANEOUS at 10:48

## 2024-04-11 RX ADMIN — SODIUM CHLORIDE 100 MILLILITER(S): 9 INJECTION INTRAMUSCULAR; INTRAVENOUS; SUBCUTANEOUS at 21:33

## 2024-04-11 RX ADMIN — APIXABAN 2.5 MILLIGRAM(S): 2.5 TABLET, FILM COATED ORAL at 21:29

## 2024-04-11 RX ADMIN — CEFTRIAXONE 1000 MILLIGRAM(S): 500 INJECTION, POWDER, FOR SOLUTION INTRAMUSCULAR; INTRAVENOUS at 19:35

## 2024-04-11 RX ADMIN — ATORVASTATIN CALCIUM 80 MILLIGRAM(S): 80 TABLET, FILM COATED ORAL at 21:29

## 2024-04-11 RX ADMIN — Medication 1 APPLICATION(S): at 22:06

## 2024-04-11 NOTE — ED PROVIDER NOTE - PATIENT PORTAL LINK FT
You can access the FollowMyHealth Patient Portal offered by [facility] by registering at the following website: [email or web address]. By joining [facility] [employer] [facility]’s FollowMyHealth portal, you will also be able to view your health information using other applications (apps) compatible with our system.

## 2024-04-11 NOTE — ED ADULT NURSE NOTE - OBJECTIVE STATEMENT
Pt presents to ED s/p unwitnessed fall at home. Pts daughter at bedside     Pt brought in by ambulance from home to the ED with c/o fall. EMS reports pt's daughter last saw patient last night and found her this morning on the floor, unknown headstrike, unknown LOC. Pt unaware how long she was on the floor for. pt noted to have bruising on her right eye. Pt is on Eliquis. MD Mancuso evaluated patient and NA called at 9:33. Pt sent to CT. Pt presents to ED s/p unwitnessed fall at home. Pts daughter at bedside states she left her moms house around 9:30pm and went to her house at 8:30am to find her on the floor between the couch and the coffee table. Pt poor historian and states she is unsure what happened. Pt denies any pain at this time. Abrasion noted to R cheek. No acute distress noted at this time. NA called in triage and pt brought directly to CT.    Pt brought in by ambulance from home to the ED with c/o fall. EMS reports pt's daughter last saw patient last night and found her this morning on the floor, unknown headstrike, unknown LOC. Pt unaware how long she was on the floor for. pt noted to have bruising on her right eye. Pt is on Eliquis. MD Mancuso evaluated patient and TEA called at 9:33. Pt sent to CT.

## 2024-04-11 NOTE — ED ADULT TRIAGE NOTE - CHIEF COMPLAINT QUOTE
Pt brought in by ambulance from home to the ED with c/o fall. EMS reports pt's daughter last saw patient last night and found her this morning on the floor, unknown headstrike, unknown LOC. Pt unaware how long she was on the floor for. pt noted to have bruising on her right eye. Pt is on Eliquis. MD Mancuso evaluated patient and NA called at 9:33. Pt sent to CT.

## 2024-04-11 NOTE — H&P ADULT - TIME BILLING
A minimum of 75 min was spent completing this admission not including time spent discussing advanced care planning or discussing goals of care with a minimum of 36 min spent face to face with patient while in ED unit on admission, counseling patient on current acute on chronic conditions and discussing plan and coordination of care including starting IV abx for UTI treatment, having PT evaluation for possible LORELEI placement and findings on imaging c/w Pagets yanci.

## 2024-04-11 NOTE — ED PROVIDER NOTE - PROGRESS NOTE DETAILS
ED Attending Dr. Lazo, pt and daughter updated on results of tests.  pt has paget dz.  pt currently at baseline.  Plan ambulation trial and if pass, then d.c ED Attending Dr. Lazo, d/w social work, Tammi and pt will need admission for placement

## 2024-04-11 NOTE — PHARMACOTHERAPY INTERVENTION NOTE - COMMENTS
Medication history complete. Medications and allergies reviewed with patient's daughter, Kaity [phone] and confirmed with .

## 2024-04-11 NOTE — ED PROVIDER NOTE - OBJECTIVE STATEMENT
89 y/o female with a PMHx of Afib on Eliquis, CAD, dyslipidemia presents to the ED s/p unwitnessed fall. Pt was last seen by her daughter at 9:30pm last night. Daughter checked on pt around 8:30am today and found pt on the floor. Last Tdap unknown. No other complaints at this time.

## 2024-04-11 NOTE — H&P ADULT - HISTORY OF PRESENT ILLNESS
Pt is a 89 yo female witha pmh/o afib on eliquis, CAD, HLD, paget dz,, who lives alone and is normally independent, who was found by daughter confused from her baseline AAOx4 on the floor this morning. Daughter lives only a few houses away. States pt was last seen at about 9:30 the night before. Pt states she feels weak and does not feel comfortable walking alone. Pt failed ambulation trial in ED.   Pt admitted for evaluation of LORELEI placement after pan scan neg for acute pathology. Pt oriented now to person, place, situation but not time. Denies any paresthesias, vertigo, changes in vision/hearing/speech, headache, nausea, chest pain, palpitations, sob, cough, fever, chills, dysuria, urinary hesitancy incontinence. Noted to have abrasion under right eye and swelling to right temple.     On admission, leukocytosis noted. Urinalysis obtained and results c/w UTI. Pt admitted for acute encephalopathy secondary to UTI.

## 2024-04-11 NOTE — ED PROVIDER NOTE - NSFOLLOWUPINSTRUCTIONS_ED_ALL_ED_FT
Please call and follow up with your doctor in 1-3 days.    Use Tylenol (acetaminophen) 1000mg every 6 hours  as need for pain.     Return to the Emergency Department for worsening or persistent symptoms, and/or ANY NEW OR CONCERNING SYMPTOMS. If you have issues obtaining follow up, please call: [phone] or [phone]  to obtain a doctor or specialist who takes your insurance in your area.    Fall Prevention    WHAT YOU NEED TO KNOW:    Fall prevention includes ways to make your home and other areas safer. It also includes ways you can move more carefully to prevent a fall. Health conditions that cause changes in your blood pressure, vision, or muscle strength and coordination may increase your risk for falls. Medicines may also increase your risk for falls if they make you dizzy, weak, or sleepy.     DISCHARGE INSTRUCTIONS:    Call 911 or have someone else call if:     You have fallen and are unconscious.      You have fallen and cannot move part of your body.    Contact your healthcare provider if:     You have fallen and have pain or a headache.      You have questions or concerns about your condition or care.    Fall prevention tips:     Stand or sit up slowly. This may help you keep your balance and prevent falls.      Use assistive devices as directed. Your healthcare provider may suggest that you use a cane or walker to help you keep your balance. You may need to have grab bars put in your bathroom near the toilet or in the shower.      Wear shoes that fit well and have soles that . Wear shoes both inside and outside. Use slippers with good . Do not wear shoes with high heels.      Wear a personal alarm. This is a device that allows you to call 911 if you fall and need help. Ask your healthcare provider for more information.      Stay active. Exercise can help strengthen your muscles and improve your balance. Your healthcare provider may recommend water aerobics or walking. He or she may also recommend physical therapy to improve your coordination. Never start an exercise program without talking to your healthcare provider first.       Manage your medical conditions. Keep all appointments with your healthcare providers. Visit your eye doctor as directed.           Home safety tips:     Add items to prevent falls in the bathroom. Put nonslip strips on your bath or shower floor to prevent you from slipping. Use a bath mat if you do not have carpet in the bathroom. This will prevent you from falling when you step out of the bath or shower. Use a shower seat so you do not need to stand while you shower. Sit on the toilet or a chair in your bathroom to dry yourself and put on clothing. This will prevent you from losing your balance from drying or dressing yourself while you are standing.       Keep paths clear. Remove books, shoes, and other objects from walkways and stairs. Place cords for telephones and lamps out of the way so that you do not need to walk over them. Tape them down if you cannot move them. Remove small rugs. If you cannot remove a rug, secure it with double-sided tape. This will prevent you from tripping.       Install bright lights in your home. Use night lights to help light paths to the bathroom or kitchen. Always turn on the light before you start walking.      Keep items you use often on shelves within reach. Do not use a step stool to help you reach an item.      Paint or place reflective tape on the edges of your stairs. This will help you see the stairs better.    Follow up with your healthcare provider as directed: Write down your questions so you remember to ask them during your visits.    Head Injury    WHAT YOU NEED TO KNOW:    A head injury is most often caused by a blow to the head. This may occur from a fall, bicycle injury, sports injury, being struck in the head, or a motor vehicle accident.     DISCHARGE INSTRUCTIONS:    Call 911 or have someone else call for any of the following:     You cannot be woken.      You have a seizure.      You stop responding to others or you faint.      You have blurry or double vision.      Your speech becomes slurred or confused.      You have arm or leg weakness, loss of feeling, or new problems with coordination.      Your pupils are larger than usual or one pupil is a different size than the other.       You have blood or clear fluid coming out of your ears or nose.    Return to the emergency department if:     You have repeated or forceful vomiting.      You feel confused.      Your headache gets worse or becomes severe.      You or someone caring for you notices that you are harder to wake than usual.    Contact your healthcare provider if:     Your symptoms last longer than 6 weeks after the injury.      You have questions or concerns about your condition or care.    Medicines:     Acetaminophen decreases pain. Acetaminophen is available without a doctor's order. Ask how much to take and how often to take it. Follow directions. Acetaminophen can cause liver damage if not taken correctly.      Take your medicine as directed. Contact your healthcare provider if you think your medicine is not helping or if you have side effects. Tell him or her if you are allergic to any medicine. Keep a list of the medicines, vitamins, and herbs you take. Include the amounts, and when and why you take them. Bring the list or the pill bottles to follow-up visits. Carry your medicine list with you in case of an emergency.    Self-care:     Rest or do quiet activities for 24 to 48 hours. Limit your time watching TV, using the computer, or doing tasks that require a lot of thinking. Slowly return to your normal activities as directed. Do not play sports or do activities that may cause you to get hit in the head. Ask your healthcare provider when you can return to sports.       Apply ice on your head for 15 to 20 minutes every hour or as directed. Use an ice pack, or put crushed ice in a plastic bag. Cover it with a towel before you apply it to your skin. Ice helps prevent tissue damage and decreases swelling and pain.       Have someone stay with you for 24 hours or as directed. This person can monitor you for complications and call 911. When you are awake the person should ask you a few questions to see if you are thinking clearly. An example would be to ask your name or your address.     Prevent another head injury:     Wear a helmet that fits properly. Do this when you play sports, or ride a bike, scooter, or skateboard. Helmets help decrease your risk of a serious head injury. Talk to your healthcare provider about other ways you can protect yourself if you play sports.      Wear your seat belt every time you are in a car. This helps to decrease your risk for a head injury if you are in a car accident.     Follow up with your healthcare provider as directed: Write down your questions so you remember to ask them during your visits.    Abrasion    WHAT YOU NEED TO KNOW:    An abrasion is a scrape on your skin. It happens when your skin rubs against a rough surface. Some examples of an abrasion include rug burn, a skinned elbow, or road rash. Abrasions can be many shapes and sizes. The wound may hurt, bleed, bruise, or swell.     DISCHARGE INSTRUCTIONS:    Return to the emergency department if:     The bleeding does not stop after 10 minutes of firm pressure.      You cannot rinse one or more foreign objects out of your wound.      You have red streaks on your skin coming from your wound.    Contact your healthcare provider if:     You have a fever or chills.       Your abrasion is red, warm, swollen, or draining pus.      You have questions or concerns about your condition or care.    Care for your abrasion:     Wash your hands and dry them with a clean towel.      Press a clean cloth against your wound to stop any bleeding.      Rinse your wound with a lot of clean water. Do not use harsh soap, alcohol, or iodine solutions.      Use a clean, wet cloth to remove any objects, such as small pieces of rocks or dirt.      Rub antibiotic ointment on your wound. This may help prevent infection and help your wound heal.      Cover the wound with a non-stick bandage. Change the bandage daily, and if gets wet or dirty.     Follow up with your healthcare provider as directed: Write down your questions so you remember to ask them during your visits.

## 2024-04-11 NOTE — H&P ADULT - MUSCULOSKELETAL
ROM intact/no calf tenderness/strength 5/5 bilateral upper extremities/no chest wall tenderness/decreased strength

## 2024-04-11 NOTE — PATIENT PROFILE ADULT - FALL HARM RISK - HARM RISK INTERVENTIONS
Assistance with ambulation/Assistance OOB with selected safe patient handling equipment/Communicate Risk of Fall with Harm to all staff/Monitor for mental status changes/Move patient closer to nurses' station/Reinforce activity limits and safety measures with patient and family/Reorient to person, place and time as needed/Tailored Fall Risk Interventions/Toileting schedule using arm’s reach rule for commode and bathroom/Use of alarms - bed, chair and/or voice tab/Visual Cue: Yellow wristband and red socks/Bed in lowest position, wheels locked, appropriate side rails in place/Call bell, personal items and telephone in reach/Instruct patient to call for assistance before getting out of bed or chair/Non-slip footwear when patient is out of bed/[place] to call system/Physically safe environment - no spills, clutter or unnecessary equipment/Purposeful Proactive Rounding/Room/bathroom lighting operational, light cord in reach

## 2024-04-11 NOTE — ED ADULT NURSE NOTE - NSFALLHARMRISKINTERV_ED_ALL_ED
Assistance OOB with selected safe patient handling equipment if applicable/Assistance with ambulation/Communicate risk of Fall with Harm to all staff, patient, and family/Monitor gait and stability/Provide visual cue: red socks, yellow wristband, yellow gown, etc/Reinforce activity limits and safety measures with patient and family/Bed in lowest position, wheels locked, appropriate side rails in place/Call bell, personal items and telephone in reach/Instruct patient to call for assistance before getting out of bed/chair/stretcher/Non-slip footwear applied when patient is off stretcher/[place] to call system/Physically safe environment - no spills, clutter or unnecessary equipment/Purposeful Proactive Rounding/Room/bathroom lighting operational, light cord in reach

## 2024-04-11 NOTE — ED ADULT NURSE NOTE - NSHOSCREENINGQ1_ED_ALL_ED
History of Present Illness


Consult date: 07/04/20


Reason for consult: dyspnea


Chief complaint: Chest pain


History of present illness: 





This is a 77-year-old white male with history of hypertension, dyslipidemia, 

dementia, history of prostate cancer and previous radiation treatment, patient 

presented to the ER on 7/2/20, his chief complaint was mostly left-sided chest p

ain, started that same day, and it was at rest.  It was associated with 

diaphoresis and nausea.  Patient was found to have ST elevation in the inferior 

leads, and significantly elevated troponin.  Underwent cardiac catheterization, 

and stenting of the RCA.  Echocardiogram showed moderate severe LV dysfunction. 

And mild pulmonary hypertension.  Initial chest x-ray on admission showed 

interstitial alveolar edema, and over the last 24 hours, his chest x-ray 

significantly worsened, more interstitial edema is noted, and his O2 requirement

went up to 9 L.  Patient was transferred to the ICU, and I was asked to see him 

on consultation.  Chest x-ray clearly is suggestive of pulmonary edema, of cour

se cannot rule out underlying pneumonia in the right lower lobe.  However on 

presentation his presentation was mostly a presentation of chest pain, and 

coronary artery disease.  Pro-calcitonin level was noted to be a bit elevated, 

hence the patient was placed empirically on antibiotics.  Again the patient had 

no symptoms to suggest fever chills cough wheezing or hemoptysis.  No BNP level 

noted on admission, but his troponin level was 70.3.  PCR for covid 19 was 

negative.





Review of Systems





CONSTITUTIONAL: No fever, no malaise, no fatigue. 


HEENT: No recent visual problems or hearing problems. Denied any sore throat. 


CARDIOVASCULAR: As noted in HPI, patient presented with left-sided chest pain 

and diaphoresis.


PULMONARY: As noted in HPI.


GASTROINTESTINAL: No diarrhea, no nausea, no vomiting, no abdominal pain. 


NEUROLOGICAL: No headaches, no weakness, no numbness. 


HEMATOLOGICAL: Denies any bleeding or petechiae. 


GENITOURINARY: Denies any burning micturition, frequency, or urgency. 


MUSCULOSKELETAL/RHEUMATOLOGICAL: Denies any joint pain, swelling, or any muscle 

pain. 


ENDOCRINE: Denies any polyuria or polydipsia. 





T





Past Medical History


Past Medical History: Cancer, Dementia, Hearing Disorder / Deafness, Hyperli

pidemia, Hypertension, Prostate Disorder


Additional Past Medical History / Comment(s): Prostate cancer with radiation 

treatments, benign colon polyps, bilateral hearing aides.


History of Any Multi-Drug Resistant Organisms: None Reported


Past Surgical History: Heart Catheterization With Stent


Additional Past Surgical History / Comment(s): Cystoscopy, colonoscopy with 

benign polypectomy, EGD as a child for foreign object, R elbow fracture with 

surgery, L hand injury with surgery.


Past Anesthesia/Blood Transfusion Reactions: No Reported Reaction


Date of Last Stent Placement:: 7/2/20


Smoking Status: Former smoker





- Past Family History


  ** Father


Additional Family Medical History / Comment(s): Father was an alcoholic.





  ** Mother


Additional Family Medical History / Comment(s): Mother was an alcoholic





Medications and Allergies


                                Home Medications











 Medication  Instructions  Recorded  Confirmed  Type


 


Atorvastatin Calcium [Lipitor] 20 mg PO HS 07/02/20 07/02/20 History


 


Beet Root 1000mg 2,000 mg PO DAILY 07/02/20 07/02/20 History


 


Cholecalciferol [Vitamin D3 (25 5,000 unit PO DAILY 07/02/20 07/02/20 History





Mcg = 1000 Iu)]    


 


Citalopram Hydrobromide [CeleXA] 20 mg PO DAILY 07/02/20 07/02/20 History


 


Donepezil [Aricept] 10 mg PO DAILY 07/02/20 07/02/20 History


 


Magnesium Oxide [Mag-Ox] 800 mg PO DAILY 07/02/20 07/02/20 History


 


Memantine HCl [Memantine HCl ER] 28 mg PO DAILY 07/02/20 07/02/20 History


 


Tamsulosin HCl [Flomax] 0.4 mg PO DAILY 07/02/20 07/02/20 History


 


Vitamin B Complex 1 tab PO DAILY 07/02/20 07/02/20 History


 


amLODIPine [Norvasc] 5 mg PO DAILY 07/02/20 07/02/20 History


 


traZODone  mg PO HS 07/02/20 07/02/20 History








                                    Allergies











Allergy/AdvReac Type Severity Reaction Status Date / Time


 


Penicillins Allergy  Rash/Hives Verified 07/02/20 11:16














Physical Exam


Vitals: 


                                   Vital Signs











  Temp Pulse Resp BP BP Pulse Ox


 


 07/04/20 11:52      91/55 


 


 07/04/20 11:50      97/62 


 


 07/04/20 11:00   80  22  102/90   90 L


 


 07/04/20 10:00    26 H  103/66  


 


 07/04/20 09:00   78  32 H    92 L


 


 07/04/20 08:00   73  19  92/62   90 L


 


 07/04/20 07:00   81  21  92/62   89 L


 


 07/04/20 06:00   78  26 H  95/65   91 L


 


 07/04/20 05:00   73  23  93/60   95


 


 07/04/20 04:00  97.1 F L  82  22  88/57   91 L


 


 07/04/20 03:00   75  23   


 


 07/04/20 02:00   78  14  96/59  


 


 07/04/20 01:00  98.2 F  77  26 H  88/61   93 L


 


 07/04/20 00:00   75  24  91/62   88 L


 


 07/03/20 23:00   76  18    84 L


 


 07/03/20 22:00   78  14  89/64   91 L


 


 07/03/20 21:00   79  28 H  105/64   90 L


 


 07/03/20 20:00  99.3 F  84  34 H  99/69   90 L


 


 07/03/20 19:58       91 L


 


 07/03/20 19:28   81  16  98/63   89 L


 


 07/03/20 19:00   88  35 H  98/78   92 L


 


 07/03/20 18:30   84  17  98/65   91 L


 


 07/03/20 18:00   80  34 H  100/69   90 L


 


 07/03/20 17:30   80  32 H  94/68   90 L


 


 07/03/20 17:00   85  25 H  119/74   90 L


 


 07/03/20 16:30   99  24  99/65   91 L


 


 07/03/20 16:00   79  18  109/67   92 L


 


 07/03/20 15:30   79  27 H  105/67   90 L


 


 07/03/20 15:00   81  28 H  101/67   93 L


 


 07/03/20 14:30   82  26 H  105/66   93 L


 


 07/03/20 14:00   79  27 H  114/68   92 L


 


 07/03/20 13:30   87  25 H  102/63   90 L








                                Intake and Output











 07/03/20 07/04/20 07/04/20





 22:59 06:59 14:59


 


Intake Total 600  


 


Output Total  150 0


 


Balance 600 -150 0


 


Intake:   


 


    


 


    Levofloxacin 500Mg-D5w 100  





    Pmx 500 mg In Dextrose/   





    Water 1 100ml.bag @ 100   





    mls/hr IVPB DAILY@2000   





    [facility] Rx#:[id number]   


 


  Oral 500  


 


Output:   


 


  Urine  150 0


 


Other:   


 


  Voiding Method Bedside Commode Bedside Commode 


 


  # Voids 0 0 1


 


  # Bowel Movements 1  1


 


  Weight  77.2 kg 














Physical Exam: Revealed a 77-year-old white male in no distress, however he is 

on 9 L high flow nasal cannula.


Head: Atraumatic, normocephalic.


HEENT:[Neck is supple.] [No neck masses.] [No thyromegaly.] [No JVD.]


Chest:  Symmetrical chest expansion, crackles at the bases.  No rhonchi no 

wheezes.


Cardiac Exam: [Normal S1 and S2, no S3 gallop, 2/6 systolic murmur thought the 

precordium.


Abdomen: [Soft, nontender,  no megaly, no rebound, no guarding, normal bowel 

sounds.]


Extremities: [No clubbing, no edema, no cyanosis.]


Neurological Exam: [No focal neurologic deficit.]  Alert and oriented 3.


Skin: No rashes.


Psychiatric: Normal mood affect and normal mental status examination.





Results





- Laboratory Findings


CBC and BMP: 


                                 07/04/20 05:02





                                 07/04/20 05:02


PT/INR, D-dimer











PT  9.8 sec (9.0-12.0)   07/02/20  08:02    


 


INR  0.9  (<1.2)   07/02/20  08:02    








Abnormal lab findings: 


                                  Abnormal Labs











  07/02/20 07/02/20 07/02/20





  08:02 08:02 08:02


 


WBC  13.0 H  


 


RBC  3.97 L  


 


MCV   


 


Neutrophils #  10.6 H  


 


Monocytes #  1.1 H  


 


Sodium   


 


BUN   


 


Creatinine   


 


Glucose   133 H 


 


POC Glucose (mg/dL)   


 


AST   495 H 


 


ALT   73 H 


 


Total Creatine Kinase    2933 H*


 


CK-MB (CK-2)    127.0 H


 


Troponin I    70.300 H*


 


Procalcitonin   














  07/02/20 07/03/20 07/03/20





  10:42 04:46 04:46


 


WBC    18.6 H


 


RBC    4.18 L


 


MCV    103.2 H


 


Neutrophils #   


 


Monocytes #   


 


Sodium   134 L 


 


BUN   27 H 


 


Creatinine   1.49 H 


 


Glucose   188 H 


 


POC Glucose (mg/dL)  131 H  


 


AST   


 


ALT   


 


Total Creatine Kinase   


 


CK-MB (CK-2)   


 


Troponin I   


 


Procalcitonin   














  07/03/20 07/03/20 07/03/20





  19:45 19:45 19:45


 


WBC  19.6 H  


 


RBC  4.15 L  


 


MCV  100.6 H  


 


Neutrophils #  16.5 H  


 


Monocytes #  1.5 H  


 


Sodium   131 L 


 


BUN   37 H 


 


Creatinine   1.48 H 


 


Glucose   155 H 


 


POC Glucose (mg/dL)   


 


AST   


 


ALT   


 


Total Creatine Kinase   


 


CK-MB (CK-2)   


 


Troponin I   


 


Procalcitonin    0.23 H














  07/04/20 07/04/20





  05:02 05:02


 


WBC  18.1 H 


 


RBC  3.85 L 


 


MCV  101.7 H 


 


Neutrophils #  15.5 H 


 


Monocytes #  1.1 H 


 


Sodium   132 L


 


BUN   43 H


 


Creatinine   1.53 H


 


Glucose   139 H


 


POC Glucose (mg/dL)  


 


AST  


 


ALT  


 


Total Creatine Kinase  


 


CK-MB (CK-2)  


 


Troponin I  


 


Procalcitonin  














- Diagnostic Findings


Chest x-ray: image reviewed (Chest x-ray as noted in HPI.)





Assessment and Plan


Assessment: 





Impression:


Acute inferior wall ST elevation myocardial infarction, status post stenting of 

RCA.


Acute pulmonary edema secondary to acute systolic congestive heart failure, 

ischemic cardiomyopathy.


Benign essential hypertension.


Dyslipidemia.


History of mild dementia.


Possible right lower lobe pneumonia, however the presentation is not classic of 

pneumonia presentation.  However considering elevated pro calcitonin, would 

recommend empiric antibiotics as already given.  Patient is presently on 

Levaquin.


Acute kidney injury, multifactorial, I believe it is mostly related to 

hypotension, cardiac catheterization with contrast media, contrast induced 

kidney injury.





Recommendation:


Continue present treatment plan,


Continue oxygen.


Continue antibiotics empirically.


Continue diuretics however monitor renal status while on high-dose diuretics.  I

would only give the patient 1 dose of Lasix today, and decide on a daily basis 

on the dose of Lasix.


Continue cardiac meds, including Plavix, statins, and beta blockers.


Resume home meds


We'll continue to follow while in the ICU.


Discussed his condition with nephrology on the case





Time with Patient: Greater than 30 No

## 2024-04-11 NOTE — H&P ADULT - ASSESSMENT
#Acute encephalopathy  #Acute UTI    #Falls  #Elevated Creatinine Kinase    #Facial abrasion    #Atrial fibrillation    #Hypoalbuminemia    #Elevated Alk Phos  #Paget's disease   Pt is a 89 yo female who presents from home after being found altered and on the floor by her daughter Admitted due to:    #Acute encephalopathy  #Acute UTI  admit to med/surg  c/w rocephin  f/u urine culture  CT head neg for acute pathology  Pt with neuro exam wnl, no deficits noted  OOB and ambulate with assistance  Tylenol prn fever, pain  DASH/TLC diet  f/u AM labs  On eliquis for DVT ppx  SW on consult for possible [facility] placement vs home with home services pending clinical course.     #Falls  #Elevated Creatinine Kinase  s/p 500 cc IVF  1L bolus ordered  po hydration promoted  fall precaution  PT consult    #Facial abrasion  bacitracin bid    #Atrial fibrillation  c/w BB  c/w eliquis    #Hypoalbuminemia  PO intake encouraged    #Elevated Alk Phos  #Paget's disease  Evident in imaging  Family made aware in ED- pt has Pagets dz  F/u with PMD outpatient

## 2024-04-11 NOTE — ED ADULT NURSE REASSESSMENT NOTE - NS ED NURSE REASSESS COMMENT FT1
Pt received alert but confused. Per daughter pt is normally A+Ox4 but forgetful. Straight cathed for UA C+S. Incontinent of urine. Skin intact. Pt has small abrasion on right side of face. Cleansed and bacitracin applied. Pt's daughter instructed in plan of care over the phone. Pt is cooperative and pleasant. Monitored closely. Bed alarm in place for safety.

## 2024-04-11 NOTE — ED PROVIDER NOTE - MUSCULOSKELETAL, MLM
Spine appears normal, range of motion is not limited, no muscle or joint tenderness. Nontender extremities.

## 2024-04-11 NOTE — ED ADULT NURSE REASSESSMENT NOTE - NS ED NURSE REASSESS COMMENT FT1
Cleaned scrape on pts face and applies bacitracin. Pts daughter at bedside. No acute distress noted at this time. Comfort and safety measures maintained.

## 2024-04-11 NOTE — ED PROVIDER NOTE - SKIN, MLM
Skin normal color for race, warm, dry. No evidence of rash. Redness and skin tear right cheek. Skin normal color for race, warm. Redness and skin tear right cheek.

## 2024-04-12 LAB
ALBUMIN SERPL ELPH-MCNC: 2.8 G/DL — LOW (ref 3.3–5)
ALP SERPL-CCNC: 379 U/L — HIGH (ref 40–120)
ALT FLD-CCNC: 15 U/L — SIGNIFICANT CHANGE UP (ref 12–78)
ANION GAP SERPL CALC-SCNC: 6 MMOL/L — SIGNIFICANT CHANGE UP (ref 5–17)
APTT BLD: 29.4 SEC — SIGNIFICANT CHANGE UP (ref 24.5–35.6)
AST SERPL-CCNC: 23 U/L — SIGNIFICANT CHANGE UP (ref 15–37)
BASOPHILS # BLD AUTO: 0.05 K/UL — SIGNIFICANT CHANGE UP (ref 0–0.2)
BASOPHILS NFR BLD AUTO: 0.4 % — SIGNIFICANT CHANGE UP (ref 0–2)
BILIRUB SERPL-MCNC: 0.6 MG/DL — SIGNIFICANT CHANGE UP (ref 0.2–1.2)
BUN SERPL-MCNC: 28 MG/DL — HIGH (ref 7–23)
CALCIUM SERPL-MCNC: 9.3 MG/DL — SIGNIFICANT CHANGE UP (ref 8.5–10.1)
CHLORIDE SERPL-SCNC: 109 MMOL/L — HIGH (ref 96–108)
CO2 SERPL-SCNC: 23 MMOL/L — SIGNIFICANT CHANGE UP (ref 22–31)
CREAT SERPL-MCNC: 0.84 MG/DL — SIGNIFICANT CHANGE UP (ref 0.5–1.3)
EGFR: 65 ML/MIN/1.73M2 — SIGNIFICANT CHANGE UP
EOSINOPHIL # BLD AUTO: 0 K/UL — SIGNIFICANT CHANGE UP (ref 0–0.5)
EOSINOPHIL NFR BLD AUTO: 0 % — SIGNIFICANT CHANGE UP (ref 0–6)
GLUCOSE SERPL-MCNC: 109 MG/DL — HIGH (ref 70–99)
HCT VFR BLD CALC: 39.1 % — SIGNIFICANT CHANGE UP (ref 34.5–45)
HGB BLD-MCNC: 12.9 G/DL — SIGNIFICANT CHANGE UP (ref 11.5–15.5)
IMM GRANULOCYTES NFR BLD AUTO: 0.3 % — SIGNIFICANT CHANGE UP (ref 0–0.9)
INR BLD: 1.27 RATIO — HIGH (ref 0.85–1.18)
LYMPHOCYTES # BLD AUTO: 2.85 K/UL — SIGNIFICANT CHANGE UP (ref 1–3.3)
LYMPHOCYTES # BLD AUTO: 22 % — SIGNIFICANT CHANGE UP (ref 13–44)
MCHC RBC-ENTMCNC: 30 PG — SIGNIFICANT CHANGE UP (ref 27–34)
MCHC RBC-ENTMCNC: 33 GM/DL — SIGNIFICANT CHANGE UP (ref 32–36)
MCV RBC AUTO: 90.9 FL — SIGNIFICANT CHANGE UP (ref 80–100)
MONOCYTES # BLD AUTO: 1 K/UL — HIGH (ref 0–0.9)
MONOCYTES NFR BLD AUTO: 7.7 % — SIGNIFICANT CHANGE UP (ref 2–14)
NEUTROPHILS # BLD AUTO: 9.02 K/UL — HIGH (ref 1.8–7.4)
NEUTROPHILS NFR BLD AUTO: 69.6 % — SIGNIFICANT CHANGE UP (ref 43–77)
PLATELET # BLD AUTO: 307 K/UL — SIGNIFICANT CHANGE UP (ref 150–400)
POTASSIUM SERPL-MCNC: 4.3 MMOL/L — SIGNIFICANT CHANGE UP (ref 3.5–5.3)
POTASSIUM SERPL-SCNC: 4.3 MMOL/L — SIGNIFICANT CHANGE UP (ref 3.5–5.3)
PROT SERPL-MCNC: 6.6 GM/DL — SIGNIFICANT CHANGE UP (ref 6–8.3)
PROTHROM AB SERPL-ACNC: 14.3 SEC — HIGH (ref 9.5–13)
RBC # BLD: 4.3 M/UL — SIGNIFICANT CHANGE UP (ref 3.8–5.2)
RBC # FLD: 12.8 % — SIGNIFICANT CHANGE UP (ref 10.3–14.5)
SODIUM SERPL-SCNC: 138 MMOL/L — SIGNIFICANT CHANGE UP (ref 135–145)
WBC # BLD: 12.96 K/UL — HIGH (ref 3.8–10.5)
WBC # FLD AUTO: 12.96 K/UL — HIGH (ref 3.8–10.5)

## 2024-04-12 PROCEDURE — 99233 SBSQ HOSP IP/OBS HIGH 50: CPT

## 2024-04-12 RX ADMIN — ATORVASTATIN CALCIUM 80 MILLIGRAM(S): 80 TABLET, FILM COATED ORAL at 20:52

## 2024-04-12 RX ADMIN — APIXABAN 2.5 MILLIGRAM(S): 2.5 TABLET, FILM COATED ORAL at 09:51

## 2024-04-12 RX ADMIN — CEFTRIAXONE 1000 MILLIGRAM(S): 500 INJECTION, POWDER, FOR SOLUTION INTRAMUSCULAR; INTRAVENOUS at 18:24

## 2024-04-12 RX ADMIN — Medication 1 APPLICATION(S): at 20:53

## 2024-04-12 RX ADMIN — Medication 1 APPLICATION(S): at 10:00

## 2024-04-12 RX ADMIN — APIXABAN 2.5 MILLIGRAM(S): 2.5 TABLET, FILM COATED ORAL at 20:52

## 2024-04-12 RX ADMIN — Medication 1000 UNIT(S): at 09:51

## 2024-04-12 RX ADMIN — Medication 25 MILLIGRAM(S): at 09:51

## 2024-04-12 NOTE — PHYSICAL THERAPY INITIAL EVALUATION ADULT - MODALITIES TREATMENT COMMENTS
Pt left OOB in chair in NAD with CBIR. Pt instructed to not get up alone. Chair alarm activated. RN aware

## 2024-04-12 NOTE — PHYSICAL THERAPY INITIAL EVALUATION ADULT - LIVES WITH, PROFILE
Pt lives at home alone in a [place] with 2 KULDIP. Patient's daughter lives 3 houses down and visits her mother. Unsure how often,

## 2024-04-13 LAB
ANION GAP SERPL CALC-SCNC: 7 MMOL/L — SIGNIFICANT CHANGE UP (ref 5–17)
BUN SERPL-MCNC: 22 MG/DL — SIGNIFICANT CHANGE UP (ref 7–23)
CALCIUM SERPL-MCNC: 9.1 MG/DL — SIGNIFICANT CHANGE UP (ref 8.5–10.1)
CHLORIDE SERPL-SCNC: 109 MMOL/L — HIGH (ref 96–108)
CO2 SERPL-SCNC: 23 MMOL/L — SIGNIFICANT CHANGE UP (ref 22–31)
CREAT SERPL-MCNC: 0.78 MG/DL — SIGNIFICANT CHANGE UP (ref 0.5–1.3)
EGFR: 71 ML/MIN/1.73M2 — SIGNIFICANT CHANGE UP
GLUCOSE SERPL-MCNC: 99 MG/DL — SIGNIFICANT CHANGE UP (ref 70–99)
HCT VFR BLD CALC: 40.6 % — SIGNIFICANT CHANGE UP (ref 34.5–45)
HGB BLD-MCNC: 13.3 G/DL — SIGNIFICANT CHANGE UP (ref 11.5–15.5)
MCHC RBC-ENTMCNC: 29.7 PG — SIGNIFICANT CHANGE UP (ref 27–34)
MCHC RBC-ENTMCNC: 32.8 GM/DL — SIGNIFICANT CHANGE UP (ref 32–36)
MCV RBC AUTO: 90.6 FL — SIGNIFICANT CHANGE UP (ref 80–100)
PLATELET # BLD AUTO: 311 K/UL — SIGNIFICANT CHANGE UP (ref 150–400)
POTASSIUM SERPL-MCNC: 4.2 MMOL/L — SIGNIFICANT CHANGE UP (ref 3.5–5.3)
POTASSIUM SERPL-SCNC: 4.2 MMOL/L — SIGNIFICANT CHANGE UP (ref 3.5–5.3)
RBC # BLD: 4.48 M/UL — SIGNIFICANT CHANGE UP (ref 3.8–5.2)
RBC # FLD: 12.6 % — SIGNIFICANT CHANGE UP (ref 10.3–14.5)
SODIUM SERPL-SCNC: 139 MMOL/L — SIGNIFICANT CHANGE UP (ref 135–145)
WBC # BLD: 11.21 K/UL — HIGH (ref 3.8–10.5)
WBC # FLD AUTO: 11.21 K/UL — HIGH (ref 3.8–10.5)

## 2024-04-13 PROCEDURE — 99232 SBSQ HOSP IP/OBS MODERATE 35: CPT

## 2024-04-13 RX ADMIN — ATORVASTATIN CALCIUM 80 MILLIGRAM(S): 80 TABLET, FILM COATED ORAL at 21:51

## 2024-04-13 RX ADMIN — CEFTRIAXONE 1000 MILLIGRAM(S): 500 INJECTION, POWDER, FOR SOLUTION INTRAMUSCULAR; INTRAVENOUS at 18:10

## 2024-04-13 RX ADMIN — Medication 25 MILLIGRAM(S): at 12:39

## 2024-04-13 RX ADMIN — Medication 1000 UNIT(S): at 12:39

## 2024-04-13 RX ADMIN — Medication 1 APPLICATION(S): at 12:45

## 2024-04-13 RX ADMIN — Medication 1 APPLICATION(S): at 21:52

## 2024-04-13 RX ADMIN — APIXABAN 2.5 MILLIGRAM(S): 2.5 TABLET, FILM COATED ORAL at 12:39

## 2024-04-13 RX ADMIN — APIXABAN 2.5 MILLIGRAM(S): 2.5 TABLET, FILM COATED ORAL at 21:52

## 2024-04-14 LAB
ALBUMIN SERPL ELPH-MCNC: 2.7 G/DL — LOW (ref 3.3–5)
ALP SERPL-CCNC: 369 U/L — HIGH (ref 40–120)
ALT FLD-CCNC: 17 U/L — SIGNIFICANT CHANGE UP (ref 12–78)
ANION GAP SERPL CALC-SCNC: 5 MMOL/L — SIGNIFICANT CHANGE UP (ref 5–17)
AST SERPL-CCNC: 26 U/L — SIGNIFICANT CHANGE UP (ref 15–37)
BILIRUB SERPL-MCNC: 0.5 MG/DL — SIGNIFICANT CHANGE UP (ref 0.2–1.2)
BUN SERPL-MCNC: 24 MG/DL — HIGH (ref 7–23)
CALCIUM SERPL-MCNC: 9 MG/DL — SIGNIFICANT CHANGE UP (ref 8.5–10.1)
CHLORIDE SERPL-SCNC: 109 MMOL/L — HIGH (ref 96–108)
CK SERPL-CCNC: 68 U/L — SIGNIFICANT CHANGE UP (ref 26–192)
CO2 SERPL-SCNC: 24 MMOL/L — SIGNIFICANT CHANGE UP (ref 22–31)
CREAT SERPL-MCNC: 0.85 MG/DL — SIGNIFICANT CHANGE UP (ref 0.5–1.3)
EGFR: 64 ML/MIN/1.73M2 — SIGNIFICANT CHANGE UP
GLUCOSE SERPL-MCNC: 107 MG/DL — HIGH (ref 70–99)
HCT VFR BLD CALC: 40.1 % — SIGNIFICANT CHANGE UP (ref 34.5–45)
HGB BLD-MCNC: 13.3 G/DL — SIGNIFICANT CHANGE UP (ref 11.5–15.5)
MCHC RBC-ENTMCNC: 30.1 PG — SIGNIFICANT CHANGE UP (ref 27–34)
MCHC RBC-ENTMCNC: 33.2 GM/DL — SIGNIFICANT CHANGE UP (ref 32–36)
MCV RBC AUTO: 90.7 FL — SIGNIFICANT CHANGE UP (ref 80–100)
PLATELET # BLD AUTO: 345 K/UL — SIGNIFICANT CHANGE UP (ref 150–400)
POTASSIUM SERPL-MCNC: 4.1 MMOL/L — SIGNIFICANT CHANGE UP (ref 3.5–5.3)
POTASSIUM SERPL-SCNC: 4.1 MMOL/L — SIGNIFICANT CHANGE UP (ref 3.5–5.3)
PROT SERPL-MCNC: 6.9 GM/DL — SIGNIFICANT CHANGE UP (ref 6–8.3)
RBC # BLD: 4.42 M/UL — SIGNIFICANT CHANGE UP (ref 3.8–5.2)
RBC # FLD: 12.7 % — SIGNIFICANT CHANGE UP (ref 10.3–14.5)
SODIUM SERPL-SCNC: 138 MMOL/L — SIGNIFICANT CHANGE UP (ref 135–145)
WBC # BLD: 13.21 K/UL — HIGH (ref 3.8–10.5)
WBC # FLD AUTO: 13.21 K/UL — HIGH (ref 3.8–10.5)

## 2024-04-14 PROCEDURE — 99232 SBSQ HOSP IP/OBS MODERATE 35: CPT

## 2024-04-14 RX ORDER — CEFTRIAXONE 500 MG/1
1000 INJECTION, POWDER, FOR SOLUTION INTRAMUSCULAR; INTRAVENOUS EVERY 24 HOURS
Refills: 0 | Status: DISCONTINUED | OUTPATIENT
Start: 2024-04-14 | End: 2024-04-15

## 2024-04-14 RX ADMIN — Medication 1 APPLICATION(S): at 21:44

## 2024-04-14 RX ADMIN — CEFTRIAXONE 1000 MILLIGRAM(S): 500 INJECTION, POWDER, FOR SOLUTION INTRAMUSCULAR; INTRAVENOUS at 09:27

## 2024-04-14 RX ADMIN — APIXABAN 2.5 MILLIGRAM(S): 2.5 TABLET, FILM COATED ORAL at 09:26

## 2024-04-14 RX ADMIN — Medication 25 MILLIGRAM(S): at 09:26

## 2024-04-14 RX ADMIN — Medication 1 APPLICATION(S): at 09:29

## 2024-04-14 RX ADMIN — Medication 1000 UNIT(S): at 09:26

## 2024-04-14 RX ADMIN — APIXABAN 2.5 MILLIGRAM(S): 2.5 TABLET, FILM COATED ORAL at 21:44

## 2024-04-14 RX ADMIN — ATORVASTATIN CALCIUM 80 MILLIGRAM(S): 80 TABLET, FILM COATED ORAL at 21:44

## 2024-04-14 NOTE — PROGRESS NOTE ADULT - SUBJECTIVE AND OBJECTIVE BOX
HOSPITALIST ATTENDING PROGRESS NOTE     Chart and meds reviewed. Patient seen and examined     CC: AMS    Subjective: Pt seen and evaluated. Has mild confusion. No other acute complaints.   daughter at bedside.     4/13: Pt seen and evaluated. sleeping, no other acute events.     4/14: Seen and evaluated. Eating breakfast. No acute complaints.   -USx: pending       All other systems and founds to be negative with exception of what has been described above.         PHYSICAL EXAM:  Vital Signs Last 24 Hrs  T(C): 36.9 (14 Apr 2024 16:13), Max: 36.9 (14 Apr 2024 16:13)  T(F): 98.4 (14 Apr 2024 16:13), Max: 98.4 (14 Apr 2024 16:13)  HR: 79 (14 Apr 2024 16:13) (75 - 80)  BP: 153/77 (14 Apr 2024 16:13) (132/65 - 153/77)  RR: 18 (14 Apr 2024 16:13) (17 - 18)  SpO2: 98% (14 Apr 2024 16:13) (97% - 99%)    Parameters below as of 14 Apr 2024 16:13  Patient On (Oxygen Delivery Method): room air            GEN: NAD   HEENT: EOMI,  moist mucous membranes, +right periorbital contusion   NECK : Soft and supple, no JVD  LUNG: CTABL, No wheezing, rales or rhonchi  CVS: S1S2+, +irregularly irregular   GI: BS+, soft, NT/ND, no guarding, no rebound  EXTREMITIES: No peripheral edema  VASCULAR: 2+ peripheral pulses  NEURO: AAOx3, grossly non-focal   SKIN: No rashes    HOME MEDICATIONS:  Home Medications:  cholecalciferol 25 mcg (1000 intl units) oral tablet: 1 tab(s) orally once a day (11 Apr 2024 16:27)  Crestor 20 mg oral tablet: 1 tab(s) orally once a day (at bedtime) (11 Apr 2024 16:26)  Eliquis 2.5 mg oral tablet: 1 tab(s) orally 2 times a day (11 Apr 2024 16:27)  metoprolol succinate 25 mg oral tablet, extended release: 1 tab(s) orally once a day (11 Apr 2024 16:27)      MEDICATIONS  MEDICATIONS  (STANDING):  apixaban 2.5 milliGRAM(s) Oral every 12 hours  atorvastatin 80 milliGRAM(s) Oral at bedtime  bacitracin   Ointment 1 Application(s) Topical two times a day  cholecalciferol 1000 Unit(s) Oral daily  metoprolol succinate ER 25 milliGRAM(s) Oral daily      LABS: All Labs Reviewed:                        13.3   11.21 )-----------( 311      ( 13 Apr 2024 07:06 )             40.6   04-13    139  |  109<H>  |  22  ----------------------------<  99  4.2   |  23  |  0.78    Ca    9.1      13 Apr 2024 07:06    TPro  6.6  /  Alb  2.8<L>  /  TBili  0.6  /  DBili  x   /  AST  23  /  ALT  15  /  AlkPhos  379<H>  04-12    Urinalysis Basic - ( 12 Apr 2024 08:15 )    Color: x / Appearance: x / SG: x / pH: x  Gluc: 109 mg/dL / Ketone: x  / Bili: x / Urobili: x   Blood: x / Protein: x / Nitrite: x   Leuk Esterase: x / RBC: x / WBC x   Sq Epi: x / Non Sq Epi: x / Bacteria: x    Culture - Urine (04.11.24 @ 18:21)    Specimen Source: Clean Catch None   Culture Results:   >100,000 CFU/ml Escherichia coli            Blood Culture:   I&O's Detail    CAPILLARY BLOOD GLUCOSE            CARDIOLOGY TESTING   CARDIAC MARKERS ( 11 Apr 2024 11:46 )  x     / x     / 541 U/L / x     / x            RADIOLOGY: reviewed 
HOSPITALIST ATTENDING PROGRESS NOTE     Chart and meds reviewed. Patient seen and examined     CC: AMS    Subjective: Pt seen and evaluated. Has mild confusion. No other acute complaints.   daughter at bedside.     4/13: Pt seen and evaluated. sleeping, no other acute events.       All other systems and founds to be negative with exception of what has been described above.         PHYSICAL EXAM:  Vital Signs Last 24 Hrs  T(C): 36.4 (13 Apr 2024 16:18), Max: 37.1 (12 Apr 2024 23:27)  T(F): 97.5 (13 Apr 2024 16:18), Max: 98.7 (12 Apr 2024 23:27)  HR: 82 (13 Apr 2024 16:18) (82 - 86)  BP: 109/72 (13 Apr 2024 16:18) (109/72 - 165/81)  RR: 17 (13 Apr 2024 16:18) (17 - 17)  SpO2: 95% (13 Apr 2024 16:18) (95% - 100%)    Parameters below as of 13 Apr 2024 16:18  Patient On (Oxygen Delivery Method): room air          GEN: NAD   HEENT: EOMI,  moist mucous membranes, +right periorbital contusion   NECK : Soft and supple, no JVD  LUNG: CTABL, No wheezing, rales or rhonchi  CVS: S1S2+, +irregularly irregular   GI: BS+, soft, NT/ND, no guarding, no rebound  EXTREMITIES: No peripheral edema  VASCULAR: 2+ peripheral pulses  NEURO: AAOx3, grossly non-focal   SKIN: No rashes    HOME MEDICATIONS:  Home Medications:  cholecalciferol 25 mcg (1000 intl units) oral tablet: 1 tab(s) orally once a day (11 Apr 2024 16:27)  Crestor 20 mg oral tablet: 1 tab(s) orally once a day (at bedtime) (11 Apr 2024 16:26)  Eliquis 2.5 mg oral tablet: 1 tab(s) orally 2 times a day (11 Apr 2024 16:27)  metoprolol succinate 25 mg oral tablet, extended release: 1 tab(s) orally once a day (11 Apr 2024 16:27)      MEDICATIONS  MEDICATIONS  (STANDING):  apixaban 2.5 milliGRAM(s) Oral every 12 hours  atorvastatin 80 milliGRAM(s) Oral at bedtime  bacitracin   Ointment 1 Application(s) Topical two times a day  cholecalciferol 1000 Unit(s) Oral daily  metoprolol succinate ER 25 milliGRAM(s) Oral daily      LABS: All Labs Reviewed:                        13.3   11.21 )-----------( 311      ( 13 Apr 2024 07:06 )             40.6   04-13    139  |  109<H>  |  22  ----------------------------<  99  4.2   |  23  |  0.78    Ca    9.1      13 Apr 2024 07:06    TPro  6.6  /  Alb  2.8<L>  /  TBili  0.6  /  DBili  x   /  AST  23  /  ALT  15  /  AlkPhos  379<H>  04-12    Urinalysis Basic - ( 12 Apr 2024 08:15 )    Color: x / Appearance: x / SG: x / pH: x  Gluc: 109 mg/dL / Ketone: x  / Bili: x / Urobili: x   Blood: x / Protein: x / Nitrite: x   Leuk Esterase: x / RBC: x / WBC x   Sq Epi: x / Non Sq Epi: x / Bacteria: x    Culture - Urine (04.11.24 @ 18:21)    Specimen Source: Clean Catch None   Culture Results:   >100,000 CFU/ml Escherichia coli            Blood Culture:   I&O's Detail    CAPILLARY BLOOD GLUCOSE            CARDIOLOGY TESTING   CARDIAC MARKERS ( 11 Apr 2024 11:46 )  x     / x     / 541 U/L / x     / x            RADIOLOGY: reviewed 
HOSPITALIST ATTENDING PROGRESS NOTE     Chart and meds reviewed. Patient seen and examined     CC: AMS    Subjective: Pt seen and evaluated. Has mild confusion. No other acute complaints.   daughter at bedside.       All other systems and founds to be negative with exception of what has been described above.         PHYSICAL EXAM:  Vital Signs Last 24 Hrs  T(C): 36.9 (12 Apr 2024 14:53), Max: 37.8 (11 Apr 2024 19:00)  T(F): 98.4 (12 Apr 2024 14:53), Max: 100.1 (11 Apr 2024 19:00)  HR: 94 (12 Apr 2024 14:53) (84 - 96)  BP: 124/57 (12 Apr 2024 14:53) (124/57 - 145/56)  RR: 18 (12 Apr 2024 14:53) (17 - 18)  SpO2: 96% (12 Apr 2024 14:53) (96% - 98%)      GEN: NAD   HEENT: EOMI,  moist mucous membranes, +right periorbital contusion   NECK : Soft and supple, no JVD  LUNG: CTABL, No wheezing, rales or rhonchi  CVS: S1S2+, +irregularly irregular   GI: BS+, soft, NT/ND, no guarding, no rebound  EXTREMITIES: No peripheral edema  VASCULAR: 2+ peripheral pulses  NEURO: AAOx3, grossly non-focal   SKIN: No rashes    HOME MEDICATIONS:  Home Medications:  cholecalciferol 25 mcg (1000 intl units) oral tablet: 1 tab(s) orally once a day (11 Apr 2024 16:27)  Crestor 20 mg oral tablet: 1 tab(s) orally once a day (at bedtime) (11 Apr 2024 16:26)  Eliquis 2.5 mg oral tablet: 1 tab(s) orally 2 times a day (11 Apr 2024 16:27)  metoprolol succinate 25 mg oral tablet, extended release: 1 tab(s) orally once a day (11 Apr 2024 16:27)      MEDICATIONS  MEDICATIONS  (STANDING):  apixaban 2.5 milliGRAM(s) Oral every 12 hours  atorvastatin 80 milliGRAM(s) Oral at bedtime  bacitracin   Ointment 1 Application(s) Topical two times a day  cefTRIAXone Injectable. 1000 milliGRAM(s) IV Push every 24 hours  cholecalciferol 1000 Unit(s) Oral daily  metoprolol succinate ER 25 milliGRAM(s) Oral daily      LABS: All Labs Reviewed:                        12.9   12.96 )-----------( 307      ( 12 Apr 2024 08:15 )             39.1     04-12    138  |  109<H>  |  28<H>  ----------------------------<  109<H>  4.3   |  23  |  0.84    Ca    9.3      12 Apr 2024 08:15    TPro  6.6  /  Alb  2.8<L>  /  TBili  0.6  /  DBili  x   /  AST  23  /  ALT  15  /  AlkPhos  379<H>  04-12    PT/INR - ( 12 Apr 2024 08:15 )   PT: 14.3 sec;   INR: 1.27 ratio         PTT - ( 12 Apr 2024 08:15 )  PTT:29.4 sec    Urinalysis Basic - ( 12 Apr 2024 08:15 )    Color: x / Appearance: x / SG: x / pH: x  Gluc: 109 mg/dL / Ketone: x  / Bili: x / Urobili: x   Blood: x / Protein: x / Nitrite: x   Leuk Esterase: x / RBC: x / WBC x   Sq Epi: x / Non Sq Epi: x / Bacteria: x        Blood Culture:   I&O's Detail    CAPILLARY BLOOD GLUCOSE            CARDIOLOGY TESTING   CARDIAC MARKERS ( 11 Apr 2024 11:46 )  x     / x     / 541 U/L / x     / x            RADIOLOGY: reviewed

## 2024-04-14 NOTE — PROGRESS NOTE ADULT - ASSESSMENT
Pt is a 89 yo female who presents from home after being found altered and on the floor by her daughter Admitted due to:    #Acute Metabolic Encephalopathy, secondary to UTI   -CT head neg for acute pathology  -UA: reviewed  -UCx: pending   -f/u on BCx   -Tylenol prn fever, pain  -Rocephin     #Falls  #Elevated Creatinine Kinase  -c/w IV fluids   -fall precaution  -PT consult: [facility]     #Facial abrasion  bacitracin bid    #Atrial fibrillation  c/w BB  c/w Eliquis    #Hypoalbuminemia  PO intake encouraged    #Elevated Alk Phos  #Paget's disease  Evident in imaging  Family made aware in ED- pt has Pagets dz  F/u with PMD outpatient    #DVT Prophylaxis   -Eliquis     #Dispo   -[facility] after 3 nights 
Pt is a 89 yo female who presents from home after being found altered and on the floor by her daughter Admitted due to:    #Acute Metabolic Encephalopathy, secondary to UTI   -CT head neg for acute pathology  -UA: reviewed  -UCx: E.coli   -USx: Pending   -BCx: NGTD   -Tylenol prn fever, pain  -Rocephin     #Falls  #Elevated Creatinine Kinase  -c/w IV fluids   -fall precaution  -PT consult: [facility]     #Facial abrasion  bacitracin bid    #Atrial fibrillation  c/w BB  c/w Eliquis    #Hypoalbuminemia  PO intake encouraged    #Elevated Alk Phos  #Paget's disease  Evident in imaging  Family made aware in ED- pt has Pagets dz  F/u with PMD outpatient    #DVT Prophylaxis   -Eliquis     #Dispo   -[facility] after 3 nights   
Pt is a 89 yo female who presents from home after being found altered and on the floor by her daughter Admitted due to:    #Acute Metabolic Encephalopathy, secondary to UTI   -CT head neg for acute pathology  -UA: reviewed  -UCx: E.coli   -USx: Pending   -BCx: NGTD   -Tylenol prn fever, pain  -Rocephin     #Falls  #Elevated Creatinine Kinase  -c/w IV fluids   -fall precaution  -PT consult: [facility]     #Facial abrasion  bacitracin bid    #Atrial fibrillation  c/w BB  c/w Eliquis    #Hypoalbuminemia  PO intake encouraged    #Elevated Alk Phos  #Paget's disease  Evident in imaging  Family made aware in ED- pt has Pagets dz  F/u with PMD outpatient    #DVT Prophylaxis   -Eliquis     #Dispo   -[facility] after 3 nights

## 2024-04-15 ENCOUNTER — TRANSCRIPTION ENCOUNTER (OUTPATIENT)
Age: 89
End: 2024-04-15

## 2024-04-15 VITALS
DIASTOLIC BLOOD PRESSURE: 82 MMHG | OXYGEN SATURATION: 97 % | HEART RATE: 78 BPM | RESPIRATION RATE: 17 BRPM | SYSTOLIC BLOOD PRESSURE: 147 MMHG | TEMPERATURE: 98 F

## 2024-04-15 LAB
-  AMOXICILLIN/CLAVULANIC ACID: SIGNIFICANT CHANGE UP
-  AMPICILLIN/SULBACTAM: SIGNIFICANT CHANGE UP
-  AMPICILLIN: SIGNIFICANT CHANGE UP
-  AZTREONAM: SIGNIFICANT CHANGE UP
-  CEFAZOLIN: SIGNIFICANT CHANGE UP
-  CEFEPIME: SIGNIFICANT CHANGE UP
-  CEFOXITIN: SIGNIFICANT CHANGE UP
-  CEFTRIAXONE: SIGNIFICANT CHANGE UP
-  CEFUROXIME: SIGNIFICANT CHANGE UP
-  CIPROFLOXACIN: SIGNIFICANT CHANGE UP
-  ERTAPENEM: SIGNIFICANT CHANGE UP
-  GENTAMICIN: SIGNIFICANT CHANGE UP
-  IMIPENEM: SIGNIFICANT CHANGE UP
-  LEVOFLOXACIN: SIGNIFICANT CHANGE UP
-  MEROPENEM: SIGNIFICANT CHANGE UP
-  NITROFURANTOIN: SIGNIFICANT CHANGE UP
-  PIPERACILLIN/TAZOBACTAM: SIGNIFICANT CHANGE UP
-  TOBRAMYCIN: SIGNIFICANT CHANGE UP
-  TRIMETHOPRIM/SULFAMETHOXAZOLE: SIGNIFICANT CHANGE UP
CULTURE RESULTS: ABNORMAL
HCT VFR BLD CALC: 40.4 % — SIGNIFICANT CHANGE UP (ref 34.5–45)
HGB BLD-MCNC: 13.6 G/DL — SIGNIFICANT CHANGE UP (ref 11.5–15.5)
MCHC RBC-ENTMCNC: 30.3 PG — SIGNIFICANT CHANGE UP (ref 27–34)
MCHC RBC-ENTMCNC: 33.7 GM/DL — SIGNIFICANT CHANGE UP (ref 32–36)
MCV RBC AUTO: 90 FL — SIGNIFICANT CHANGE UP (ref 80–100)
METHOD TYPE: SIGNIFICANT CHANGE UP
ORGANISM # SPEC MICROSCOPIC CNT: ABNORMAL
ORGANISM # SPEC MICROSCOPIC CNT: SIGNIFICANT CHANGE UP
PLATELET # BLD AUTO: 344 K/UL — SIGNIFICANT CHANGE UP (ref 150–400)
RBC # BLD: 4.49 M/UL — SIGNIFICANT CHANGE UP (ref 3.8–5.2)
RBC # FLD: 12.5 % — SIGNIFICANT CHANGE UP (ref 10.3–14.5)
SPECIMEN SOURCE: SIGNIFICANT CHANGE UP
WBC # BLD: 13.61 K/UL — HIGH (ref 3.8–10.5)
WBC # FLD AUTO: 13.61 K/UL — HIGH (ref 3.8–10.5)

## 2024-04-15 PROCEDURE — 99239 HOSP IP/OBS DSCHRG MGMT >30: CPT

## 2024-04-15 RX ORDER — BACITRACIN ZINC 500 UNIT/G
1 OINTMENT IN PACKET (EA) TOPICAL
Qty: 0 | Refills: 0 | DISCHARGE
Start: 2024-04-15

## 2024-04-15 RX ORDER — CEFPODOXIME PROXETIL 100 MG
1 TABLET ORAL
Qty: 10 | Refills: 0
Start: 2024-04-15 | End: 2024-04-19

## 2024-04-15 RX ADMIN — Medication 1 APPLICATION(S): at 10:41

## 2024-04-15 RX ADMIN — Medication 25 MILLIGRAM(S): at 09:35

## 2024-04-15 RX ADMIN — APIXABAN 2.5 MILLIGRAM(S): 2.5 TABLET, FILM COATED ORAL at 09:35

## 2024-04-15 RX ADMIN — Medication 1000 UNIT(S): at 09:34

## 2024-04-15 NOTE — DISCHARGE NOTE NURSING/CASE MANAGEMENT/SOCIAL WORK - PATIENT PORTAL LINK FT
You can access the FollowMyHealth Patient Portal offered by [facility] by registering at the following website: [email or web address]. By joining [facility]’s FollowMyHealth portal, you will also be able to view your health information using other applications (apps) compatible with our system.

## 2024-04-15 NOTE — DISCHARGE NOTE PROVIDER - NSDCMRMEDTOKEN_GEN_ALL_CORE_FT
bacitracin 500 units/g topical ointment: 1 Apply topically to affected area 2 times a day  cefpodoxime 200 mg oral tablet: 1 tab(s) orally 2 times a day  cholecalciferol 25 mcg (1000 intl units) oral tablet: 1 tab(s) orally once a day  Crestor 20 mg oral tablet: 1 tab(s) orally once a day (at bedtime)  Eliquis 2.5 mg oral tablet: 1 tab(s) orally 2 times a day  metoprolol succinate 25 mg oral tablet, extended release: 1 tab(s) orally once a day

## 2024-04-15 NOTE — DISCHARGE NOTE PROVIDER - NSDCCPCAREPLAN_GEN_ALL_CORE_FT
PRINCIPAL DISCHARGE DIAGNOSIS  Diagnosis: Head injury  Assessment and Plan of Treatment: 89 yo female who presents from home after being found altered and on the floor by her daughter Admitted due to:  #Acute Metabolic Encephalopathy, secondary to UTI   -CT head neg for acute pathology  -UA: reviewed  -UCx: E.coli   -USx: Pending   -BCx: NGTD   -Tylenol prn fever, pain  -Rocephin   #Falls  #Elevated Creatinine Kinase  -c/w IV fluids   -fall precaution  -PT consult: LORELEI   #Facial abrasion  bacitracin bid  #Atrial fibrillation  c/w BB  c/w Eliquis  #Hypoalbuminemia  PO intake encouraged  #Elevated Alk Phos  #Paget's disease  Evident in imaging  Family made aware in ED- pt has Pagets dz  F/u with PMD outpatient  #DVT Prophylaxis   -Eliquis   #Dispo   -LORELEI after 3 nights         SECONDARY DISCHARGE DIAGNOSES  Diagnosis: Fall  Assessment and Plan of Treatment:     Diagnosis: Abrasion  Assessment and Plan of Treatment:

## 2024-04-15 NOTE — DISCHARGE NOTE NURSING/CASE MANAGEMENT/SOCIAL WORK - NSDCPEFALRISK_GEN_ALL_CORE
For information on Fall & Injury Prevention, visit: [email or web address] OR  [email or web address] OR  https://www.cdc.gov/steadi/patient.html

## 2024-04-15 NOTE — DISCHARGE NOTE NURSING/CASE MANAGEMENT/SOCIAL WORK - NSDCVIVACCINE_GEN_ALL_CORE_FT
Tdap; 11-Apr-2024 10:48; Maria Del Carmen Sanchez (CARLOS); [employer]; [id number] (Exp. Date: 23-Nov-2025); IntraMuscular; Deltoid Right.; 0.5 milliLiter(s); VIS (VIS Published: 09-May-2013, VIS Presented: 11-Apr-2024);

## 2024-04-15 NOTE — DISCHARGE NOTE PROVIDER - HOSPITAL COURSE
89 yo female who presents from home after being found altered and on the floor by her daughter Admitted due to:    #Acute Metabolic Encephalopathy, secondary to UTI   -CT head neg for acute pathology  -UA: reviewed  -UCx: E.coli   -BCx: NGTD   -on Rocephin   plan;  change antibiotics to vantin      #Falls  #Elevated Creatinine Kinase  -c/w IV fluids   -fall precaution  -PT consult: LORELEI     #Facial abrasion  bacitracin bid    #Atrial fibrillation  c/w BB  c/w Eliquis    #Hypoalbuminemia  PO intake encouraged    #Elevated Alk Phos  #Paget's disease  Evident in imaging  Family made aware in ED- pt has Pagets dz  F/u with PMD outpatient    #DVT Prophylaxis   -Eliquis     #Dispo   -LORELEI after 3 nights       Critical points;: white count noted: hovering around 13
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
## 2024-04-22 DIAGNOSIS — E88.09 OTHER DISORDERS OF PLASMA-PROTEIN METABOLISM, NOT ELSEWHERE CLASSIFIED: ICD-10-CM

## 2024-04-22 DIAGNOSIS — M88.0 OSTEITIS DEFORMANS OF SKULL: ICD-10-CM

## 2024-04-22 DIAGNOSIS — B96.20 UNSPECIFIED ESCHERICHIA COLI [E. COLI] AS THE CAUSE OF DISEASES CLASSIFIED ELSEWHERE: ICD-10-CM

## 2024-04-22 DIAGNOSIS — Z79.01 LONG TERM (CURRENT) USE OF ANTICOAGULANTS: ICD-10-CM

## 2024-04-22 DIAGNOSIS — F17.210 NICOTINE DEPENDENCE, CIGARETTES, UNCOMPLICATED: ICD-10-CM
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
## 2024-04-22 DIAGNOSIS — S00.81XA ABRASION OF OTHER PART OF HEAD, INITIAL ENCOUNTER: ICD-10-CM

## 2024-04-22 DIAGNOSIS — G93.41 METABOLIC ENCEPHALOPATHY: ICD-10-CM

## 2024-04-22 DIAGNOSIS — I25.10 ATHEROSCLEROTIC HEART DISEASE OF NATIVE CORONARY ARTERY WITHOUT ANGINA PECTORIS: ICD-10-CM

## 2024-04-22 DIAGNOSIS — N39.0 URINARY TRACT INFECTION, SITE NOT SPECIFIED: ICD-10-CM

## 2024-04-22 DIAGNOSIS — E78.5 HYPERLIPIDEMIA, UNSPECIFIED: ICD-10-CM

## 2024-04-22 DIAGNOSIS — I48.91 UNSPECIFIED ATRIAL FIBRILLATION: ICD-10-CM
